# Patient Record
Sex: MALE | Race: WHITE | Employment: FULL TIME | URBAN - METROPOLITAN AREA
[De-identification: names, ages, dates, MRNs, and addresses within clinical notes are randomized per-mention and may not be internally consistent; named-entity substitution may affect disease eponyms.]

---

## 2017-02-24 ENCOUNTER — OFFICE VISIT (OUTPATIENT)
Dept: FAMILY MEDICINE CLINIC | Age: 37
End: 2017-02-24

## 2017-02-24 VITALS
DIASTOLIC BLOOD PRESSURE: 82 MMHG | RESPIRATION RATE: 18 BRPM | TEMPERATURE: 98.3 F | HEIGHT: 69 IN | HEART RATE: 76 BPM | BODY MASS INDEX: 26.07 KG/M2 | WEIGHT: 176 LBS | SYSTOLIC BLOOD PRESSURE: 118 MMHG

## 2017-02-24 DIAGNOSIS — J06.9 VIRAL UPPER RESPIRATORY TRACT INFECTION: Primary | ICD-10-CM

## 2017-02-24 DIAGNOSIS — J11.1 FLU: ICD-10-CM

## 2017-02-24 DIAGNOSIS — R05.9 COUGH: ICD-10-CM

## 2017-02-24 LAB
INFLUENZA A ANTIBODY: NORMAL
INFLUENZA B ANTIBODY: NORMAL

## 2017-02-24 PROCEDURE — G8419 CALC BMI OUT NRM PARAM NOF/U: HCPCS | Performed by: NURSE PRACTITIONER

## 2017-02-24 PROCEDURE — 1036F TOBACCO NON-USER: CPT | Performed by: NURSE PRACTITIONER

## 2017-02-24 PROCEDURE — 99213 OFFICE O/P EST LOW 20 MIN: CPT | Performed by: NURSE PRACTITIONER

## 2017-02-24 PROCEDURE — 87804 INFLUENZA ASSAY W/OPTIC: CPT | Performed by: NURSE PRACTITIONER

## 2017-02-24 PROCEDURE — G8427 DOCREV CUR MEDS BY ELIG CLIN: HCPCS | Performed by: NURSE PRACTITIONER

## 2017-02-24 PROCEDURE — G8484 FLU IMMUNIZE NO ADMIN: HCPCS | Performed by: NURSE PRACTITIONER

## 2017-02-24 RX ORDER — HYDROCODONE POLISTIREX AND CHLORPHENIRAMINE POLISTIREX 10; 8 MG/5ML; MG/5ML
5 SUSPENSION, EXTENDED RELEASE ORAL EVERY 12 HOURS PRN
Qty: 115 ML | Refills: 0 | Status: SHIPPED | OUTPATIENT
Start: 2017-02-24 | End: 2017-05-04 | Stop reason: ALTCHOICE

## 2017-02-24 ASSESSMENT — ENCOUNTER SYMPTOMS
CHEST TIGHTNESS: 0
WHEEZING: 0
SORE THROAT: 1
SHORTNESS OF BREATH: 0
COUGH: 1

## 2017-04-18 ENCOUNTER — OFFICE VISIT (OUTPATIENT)
Dept: FAMILY MEDICINE CLINIC | Age: 37
End: 2017-04-18

## 2017-04-18 VITALS
HEART RATE: 76 BPM | TEMPERATURE: 97.8 F | SYSTOLIC BLOOD PRESSURE: 118 MMHG | HEIGHT: 69 IN | OXYGEN SATURATION: 96 % | RESPIRATION RATE: 18 BRPM | BODY MASS INDEX: 26.96 KG/M2 | WEIGHT: 182 LBS | DIASTOLIC BLOOD PRESSURE: 68 MMHG

## 2017-04-18 DIAGNOSIS — J20.9 ACUTE BRONCHITIS, UNSPECIFIED ORGANISM: Primary | ICD-10-CM

## 2017-04-18 PROCEDURE — 1036F TOBACCO NON-USER: CPT | Performed by: NURSE PRACTITIONER

## 2017-04-18 PROCEDURE — G8419 CALC BMI OUT NRM PARAM NOF/U: HCPCS | Performed by: NURSE PRACTITIONER

## 2017-04-18 PROCEDURE — 99213 OFFICE O/P EST LOW 20 MIN: CPT | Performed by: NURSE PRACTITIONER

## 2017-04-18 PROCEDURE — G8427 DOCREV CUR MEDS BY ELIG CLIN: HCPCS | Performed by: NURSE PRACTITIONER

## 2017-04-18 RX ORDER — DOXYCYCLINE HYCLATE 100 MG
100 TABLET ORAL 2 TIMES DAILY
Qty: 14 TABLET | Refills: 0 | Status: SHIPPED | OUTPATIENT
Start: 2017-04-18 | End: 2017-04-25

## 2017-04-18 RX ORDER — BENZONATATE 100 MG/1
100 CAPSULE ORAL 3 TIMES DAILY PRN
Qty: 30 CAPSULE | Refills: 0 | Status: SHIPPED | OUTPATIENT
Start: 2017-04-18 | End: 2017-07-28 | Stop reason: ALTCHOICE

## 2017-04-18 RX ORDER — METHYLPREDNISOLONE 4 MG/1
TABLET ORAL
Qty: 1 KIT | Refills: 0 | Status: SHIPPED | OUTPATIENT
Start: 2017-04-18 | End: 2017-04-24

## 2017-04-18 ASSESSMENT — PATIENT HEALTH QUESTIONNAIRE - PHQ9
SUM OF ALL RESPONSES TO PHQ QUESTIONS 1-9: 0
1. LITTLE INTEREST OR PLEASURE IN DOING THINGS: 0
2. FEELING DOWN, DEPRESSED OR HOPELESS: 0
SUM OF ALL RESPONSES TO PHQ9 QUESTIONS 1 & 2: 0

## 2017-04-18 ASSESSMENT — ENCOUNTER SYMPTOMS
SORE THROAT: 1
RHINORRHEA: 0
WHEEZING: 1
COUGH: 1

## 2017-05-04 ENCOUNTER — OFFICE VISIT (OUTPATIENT)
Dept: FAMILY MEDICINE CLINIC | Age: 37
End: 2017-05-04

## 2017-05-04 VITALS
HEIGHT: 69 IN | HEART RATE: 70 BPM | DIASTOLIC BLOOD PRESSURE: 72 MMHG | TEMPERATURE: 98.1 F | SYSTOLIC BLOOD PRESSURE: 120 MMHG | WEIGHT: 174 LBS | BODY MASS INDEX: 25.77 KG/M2 | RESPIRATION RATE: 12 BRPM

## 2017-05-04 DIAGNOSIS — J32.9 SINOBRONCHITIS: Primary | ICD-10-CM

## 2017-05-04 DIAGNOSIS — J45.21 RAD (REACTIVE AIRWAY DISEASE), MILD INTERMITTENT, WITH ACUTE EXACERBATION: ICD-10-CM

## 2017-05-04 DIAGNOSIS — J40 SINOBRONCHITIS: Primary | ICD-10-CM

## 2017-05-04 PROCEDURE — G8419 CALC BMI OUT NRM PARAM NOF/U: HCPCS | Performed by: FAMILY MEDICINE

## 2017-05-04 PROCEDURE — 1036F TOBACCO NON-USER: CPT | Performed by: FAMILY MEDICINE

## 2017-05-04 PROCEDURE — G8427 DOCREV CUR MEDS BY ELIG CLIN: HCPCS | Performed by: FAMILY MEDICINE

## 2017-05-04 PROCEDURE — 99213 OFFICE O/P EST LOW 20 MIN: CPT | Performed by: FAMILY MEDICINE

## 2017-05-04 RX ORDER — PREDNISONE 20 MG/1
TABLET ORAL
Qty: 18 TABLET | Refills: 0 | Status: SHIPPED | OUTPATIENT
Start: 2017-05-04 | End: 2017-07-28 | Stop reason: ALTCHOICE

## 2017-05-04 RX ORDER — AZITHROMYCIN 250 MG/1
TABLET, FILM COATED ORAL
Qty: 1 PACKET | Refills: 0 | Status: SHIPPED | OUTPATIENT
Start: 2017-05-04 | End: 2017-05-14

## 2017-05-14 ASSESSMENT — ENCOUNTER SYMPTOMS
WHEEZING: 0
SHORTNESS OF BREATH: 0
ABDOMINAL PAIN: 0
CHEST TIGHTNESS: 1
BLOOD IN STOOL: 0
SINUS PRESSURE: 0
COUGH: 1
RHINORRHEA: 0

## 2017-07-28 ENCOUNTER — OFFICE VISIT (OUTPATIENT)
Dept: FAMILY MEDICINE CLINIC | Age: 37
End: 2017-07-28

## 2017-07-28 VITALS
RESPIRATION RATE: 14 BRPM | DIASTOLIC BLOOD PRESSURE: 76 MMHG | SYSTOLIC BLOOD PRESSURE: 108 MMHG | WEIGHT: 171 LBS | HEART RATE: 66 BPM | TEMPERATURE: 97.3 F | BODY MASS INDEX: 25.33 KG/M2 | HEIGHT: 69 IN

## 2017-07-28 DIAGNOSIS — E55.9 HYPOVITAMINOSIS D: ICD-10-CM

## 2017-07-28 DIAGNOSIS — Z00.00 ENCOUNTER FOR ANNUAL PHYSICAL EXAM: Primary | ICD-10-CM

## 2017-07-28 DIAGNOSIS — Z00.00 ENCOUNTER FOR ANNUAL PHYSICAL EXAM: ICD-10-CM

## 2017-07-28 DIAGNOSIS — E78.5 DYSLIPIDEMIA: ICD-10-CM

## 2017-07-28 DIAGNOSIS — E78.49 OTHER HYPERLIPIDEMIA: ICD-10-CM

## 2017-07-28 DIAGNOSIS — Z83.49 FAMILY HISTORY OF THYROID DISEASE: ICD-10-CM

## 2017-07-28 LAB
ANION GAP SERPL CALCULATED.3IONS-SCNC: 12 MEQ/L (ref 7–13)
BASOPHILS ABSOLUTE: 0 K/UL (ref 0–0.2)
BASOPHILS RELATIVE PERCENT: 0.7 %
BUN BLDV-MCNC: 15 MG/DL (ref 6–20)
CALCIUM SERPL-MCNC: 9.4 MG/DL (ref 8.6–10.2)
CHLORIDE BLD-SCNC: 103 MEQ/L (ref 98–107)
CHOLESTEROL, TOTAL: 234 MG/DL (ref 0–199)
CO2: 26 MEQ/L (ref 22–29)
CREAT SERPL-MCNC: 0.88 MG/DL (ref 0.7–1.2)
EOSINOPHILS ABSOLUTE: 0.2 K/UL (ref 0–0.7)
EOSINOPHILS RELATIVE PERCENT: 2.9 %
GFR AFRICAN AMERICAN: >60
GFR NON-AFRICAN AMERICAN: >60
GLUCOSE BLD-MCNC: 91 MG/DL (ref 74–109)
HCT VFR BLD CALC: 48.7 % (ref 42–52)
HDLC SERPL-MCNC: 76 MG/DL (ref 40–59)
HEMOGLOBIN: 16.3 G/DL (ref 14–18)
LDL CHOLESTEROL CALCULATED: 137 MG/DL (ref 0–129)
LYMPHOCYTES ABSOLUTE: 3 K/UL (ref 1–4.8)
LYMPHOCYTES RELATIVE PERCENT: 40.3 %
MCH RBC QN AUTO: 29.7 PG (ref 27–31.3)
MCHC RBC AUTO-ENTMCNC: 33.5 % (ref 33–37)
MCV RBC AUTO: 88.7 FL (ref 80–100)
MONOCYTES ABSOLUTE: 0.6 K/UL (ref 0.2–0.8)
MONOCYTES RELATIVE PERCENT: 8.7 %
NEUTROPHILS ABSOLUTE: 3.5 K/UL (ref 1.4–6.5)
NEUTROPHILS RELATIVE PERCENT: 47.4 %
PDW BLD-RTO: 13.5 % (ref 11.5–14.5)
PLATELET # BLD: 254 K/UL (ref 130–400)
POTASSIUM SERPL-SCNC: 4.8 MEQ/L (ref 3.5–5.1)
RBC # BLD: 5.49 M/UL (ref 4.7–6.1)
SODIUM BLD-SCNC: 141 MEQ/L (ref 132–144)
TRIGL SERPL-MCNC: 106 MG/DL (ref 0–200)
TSH SERPL DL<=0.05 MIU/L-ACNC: 2.7 UIU/ML (ref 0.27–4.2)
VITAMIN D 25-HYDROXY: 53.3 NG/ML (ref 30–100)
WBC # BLD: 7.3 K/UL (ref 4.8–10.8)

## 2017-07-28 PROCEDURE — 99395 PREV VISIT EST AGE 18-39: CPT | Performed by: FAMILY MEDICINE

## 2017-07-28 RX ORDER — IBUPROFEN 800 MG/1
TABLET ORAL
Refills: 0 | COMMUNITY
Start: 2017-07-14 | End: 2017-09-06 | Stop reason: ALTCHOICE

## 2017-07-28 RX ORDER — HYDROCODONE BITARTRATE AND ACETAMINOPHEN 5; 325 MG/1; MG/1
TABLET ORAL
Refills: 0 | COMMUNITY
Start: 2017-07-14 | End: 2017-09-06 | Stop reason: ALTCHOICE

## 2017-08-06 PROBLEM — E78.5 DYSLIPIDEMIA: Status: ACTIVE | Noted: 2017-08-06

## 2017-08-06 ASSESSMENT — ENCOUNTER SYMPTOMS
RHINORRHEA: 1
BLOOD IN STOOL: 0
SINUS PRESSURE: 0
ABDOMINAL PAIN: 0
WHEEZING: 0

## 2017-09-01 ENCOUNTER — TELEPHONE (OUTPATIENT)
Dept: FAMILY MEDICINE CLINIC | Age: 37
End: 2017-09-01

## 2017-09-01 DIAGNOSIS — L30.9 ECZEMA, UNSPECIFIED TYPE: Primary | ICD-10-CM

## 2017-09-01 RX ORDER — DESOXIMETASONE 2.5 MG/G
CREAM TOPICAL
Qty: 60 G | Refills: 1 | Status: SHIPPED | OUTPATIENT
Start: 2017-09-01 | End: 2019-01-21 | Stop reason: ALTCHOICE

## 2017-09-06 ENCOUNTER — OFFICE VISIT (OUTPATIENT)
Dept: FAMILY MEDICINE CLINIC | Age: 37
End: 2017-09-06

## 2017-09-06 VITALS
BODY MASS INDEX: 26.81 KG/M2 | HEIGHT: 69 IN | WEIGHT: 181 LBS | TEMPERATURE: 97.8 F | DIASTOLIC BLOOD PRESSURE: 82 MMHG | SYSTOLIC BLOOD PRESSURE: 102 MMHG | RESPIRATION RATE: 14 BRPM | HEART RATE: 63 BPM

## 2017-09-06 DIAGNOSIS — L23.2 ALLERGIC CONTACT DERMATITIS DUE TO COSMETICS: Primary | ICD-10-CM

## 2017-09-06 DIAGNOSIS — E78.5 DYSLIPIDEMIA: ICD-10-CM

## 2017-09-06 PROCEDURE — 99212 OFFICE O/P EST SF 10 MIN: CPT | Performed by: FAMILY MEDICINE

## 2017-09-06 PROCEDURE — 1036F TOBACCO NON-USER: CPT | Performed by: FAMILY MEDICINE

## 2017-09-06 PROCEDURE — G8427 DOCREV CUR MEDS BY ELIG CLIN: HCPCS | Performed by: FAMILY MEDICINE

## 2017-09-06 PROCEDURE — G8417 CALC BMI ABV UP PARAM F/U: HCPCS | Performed by: FAMILY MEDICINE

## 2017-09-14 ASSESSMENT — ENCOUNTER SYMPTOMS
SHORTNESS OF BREATH: 0
ABDOMINAL PAIN: 0
VOICE CHANGE: 0
SINUS PRESSURE: 0
TROUBLE SWALLOWING: 0
BACK PAIN: 0

## 2017-12-27 ENCOUNTER — OFFICE VISIT (OUTPATIENT)
Dept: FAMILY MEDICINE CLINIC | Age: 37
End: 2017-12-27

## 2017-12-27 VITALS
BODY MASS INDEX: 28.58 KG/M2 | DIASTOLIC BLOOD PRESSURE: 62 MMHG | HEIGHT: 69 IN | RESPIRATION RATE: 14 BRPM | SYSTOLIC BLOOD PRESSURE: 100 MMHG | WEIGHT: 193 LBS | HEART RATE: 62 BPM | TEMPERATURE: 97.5 F

## 2017-12-27 DIAGNOSIS — R19.8 BORBORYGMUS: ICD-10-CM

## 2017-12-27 DIAGNOSIS — R10.84 ABDOMINAL PAIN, GENERALIZED: ICD-10-CM

## 2017-12-27 DIAGNOSIS — K59.1 FUNCTIONAL DIARRHEA: Primary | ICD-10-CM

## 2017-12-27 DIAGNOSIS — K59.1 FUNCTIONAL DIARRHEA: ICD-10-CM

## 2017-12-27 DIAGNOSIS — E78.5 DYSLIPIDEMIA: ICD-10-CM

## 2017-12-27 PROCEDURE — G8427 DOCREV CUR MEDS BY ELIG CLIN: HCPCS | Performed by: FAMILY MEDICINE

## 2017-12-27 PROCEDURE — 99213 OFFICE O/P EST LOW 20 MIN: CPT | Performed by: FAMILY MEDICINE

## 2017-12-27 PROCEDURE — G8417 CALC BMI ABV UP PARAM F/U: HCPCS | Performed by: FAMILY MEDICINE

## 2017-12-27 PROCEDURE — G8484 FLU IMMUNIZE NO ADMIN: HCPCS | Performed by: FAMILY MEDICINE

## 2017-12-27 PROCEDURE — 1036F TOBACCO NON-USER: CPT | Performed by: FAMILY MEDICINE

## 2017-12-27 RX ORDER — DIPHENOXYLATE HYDROCHLORIDE AND ATROPINE SULFATE 2.5; .025 MG/1; MG/1
1 TABLET ORAL 4 TIMES DAILY PRN
Qty: 30 TABLET | Refills: 1 | Status: SHIPPED | OUTPATIENT
Start: 2017-12-27 | End: 2018-01-06

## 2017-12-27 NOTE — PROGRESS NOTES
Age of Onset    High Blood Pressure Mother     Diabetes Maternal Grandmother     Stroke Maternal Grandmother     Heart Disease Paternal [place]     Stroke Paternal Grandfather           Current Outpatient Prescriptions on File Prior to Visit   Medication Sig Dispense Refill    desoximetasone (TOPICORT) 0.25 % cream Apply topically 2 times daily. 60 g 1    PREVIDENT 5000 SENSITIVE 1.1-5 % PSTE APPLY TOOTHPASTE DAILY  5     No current facility-administered medications on file prior to visit. Objective    Vitals:    12/27/17 1115   BP: 100/62   Pulse: 62   Resp: 14   Temp: 97.5 °F (36.4 °C)   TempSrc: Temporal   Weight: 193 lb (87.5 kg)   Height: 5' 8.5\" (1.74 m)     Physical Exam   Constitutional: He is oriented to person, place, and time and well-developed, well-nourished, and in no distress. No distress. HENT:   Right Ear: Tympanic membrane normal.   Left Ear: Tympanic membrane normal.   Nose: Mucosal edema and rhinorrhea present. Right sinus exhibits no maxillary sinus tenderness. Left sinus exhibits no maxillary sinus tenderness. Mouth/Throat: Oropharynx is clear and moist.   Eyes: No scleral icterus. Neck: Normal range of motion. Neck supple. Carotid bruit is not present. No neck rigidity. No thyroid mass and no thyromegaly present. Cardiovascular: Normal rate, regular rhythm, S1 normal, S2 normal and normal heart sounds. No extrasystoles are present. No murmur heard. Pulmonary/Chest: Effort normal and breath sounds normal.   Abdominal: Soft. Normal appearance and bowel sounds are normal. He exhibits no shifting dullness and no mass. There is no hepatosplenomegaly. There is tenderness (tr/4 tx only) in the left lower quadrant. There is no rigidity, no rebound, no guarding, no CVA tenderness, no tenderness at McBurney's point and negative Hahn's sign. No hernia. Musculoskeletal: He exhibits no edema. Neurological: He is alert and oriented to person, place, and time.  He has for gi. Call or return to clinic prn if these symptoms worsen or fail to improve as anticipated.       Zollie Lev, DO

## 2017-12-29 LAB
ALLERGEN CLAMS IGE: <0.1 KU/L
ALLERGEN CODFISH IGE: <0.1 KU/L
ALLERGEN CORN IGE: <0.1 KU/L
ALLERGEN COW MILK IGE: <0.1 KU/L
ALLERGEN EGG WHITE IGE: <0.1 KU/L
ALLERGEN PEANUT (F13) IGE: <0.1 KU/L
ALLERGEN SCALLOP IGE: <0.1 KU/L
ALLERGEN SEE NOTE: NORMAL
ALLERGEN SHRIMP IGE: <0.1 KU/L
ALLERGEN SOYBEAN IGE: <0.1 KU/L
ALLERGEN WALNUT IGE: <0.1 KU/L
ALLERGEN WHEAT IGE: <0.1 KU/L
IGE: 26 KU/L

## 2018-01-03 ASSESSMENT — ENCOUNTER SYMPTOMS
BLOOD IN STOOL: 0
NAUSEA: 1
BACK PAIN: 0
ABDOMINAL PAIN: 1
ABDOMINAL DISTENTION: 0
VOMITING: 0
DIARRHEA: 1
SHORTNESS OF BREATH: 0

## 2019-01-21 ENCOUNTER — OFFICE VISIT (OUTPATIENT)
Dept: FAMILY MEDICINE CLINIC | Age: 39
End: 2019-01-21
Payer: COMMERCIAL

## 2019-01-21 VITALS
WEIGHT: 177 LBS | BODY MASS INDEX: 26.22 KG/M2 | HEART RATE: 72 BPM | HEIGHT: 69 IN | TEMPERATURE: 97.8 F | RESPIRATION RATE: 14 BRPM | SYSTOLIC BLOOD PRESSURE: 112 MMHG | DIASTOLIC BLOOD PRESSURE: 70 MMHG

## 2019-01-21 DIAGNOSIS — R35.0 URINARY FREQUENCY: ICD-10-CM

## 2019-01-21 DIAGNOSIS — Z00.00 ANNUAL PHYSICAL EXAM: ICD-10-CM

## 2019-01-21 DIAGNOSIS — Z00.00 ANNUAL PHYSICAL EXAM: Primary | ICD-10-CM

## 2019-01-21 DIAGNOSIS — E78.5 DYSLIPIDEMIA: ICD-10-CM

## 2019-01-21 DIAGNOSIS — E55.9 HYPOVITAMINOSIS D: ICD-10-CM

## 2019-01-21 LAB
ALBUMIN SERPL-MCNC: 4.2 G/DL (ref 3.9–4.9)
ALP BLD-CCNC: 53 U/L (ref 35–104)
ALT SERPL-CCNC: 23 U/L (ref 0–41)
ANION GAP SERPL CALCULATED.3IONS-SCNC: 11 MEQ/L (ref 7–13)
AST SERPL-CCNC: 28 U/L (ref 0–40)
BILIRUB SERPL-MCNC: 0.6 MG/DL (ref 0–1.2)
BILIRUBIN, POC: NORMAL
BLOOD URINE, POC: NORMAL
BUN BLDV-MCNC: 13 MG/DL (ref 6–20)
CALCIUM SERPL-MCNC: 9.1 MG/DL (ref 8.6–10.2)
CHLORIDE BLD-SCNC: 100 MEQ/L (ref 98–107)
CHOLESTEROL, TOTAL: 196 MG/DL (ref 0–199)
CLARITY, POC: CLEAR
CO2: 27 MEQ/L (ref 22–29)
COLOR, POC: YELLOW
CREAT SERPL-MCNC: 0.93 MG/DL (ref 0.7–1.2)
GFR AFRICAN AMERICAN: >60
GFR NON-AFRICAN AMERICAN: >60
GLOBULIN: 2.4 G/DL (ref 2.3–3.5)
GLUCOSE BLD-MCNC: 86 MG/DL (ref 74–109)
GLUCOSE URINE, POC: NORMAL
HDLC SERPL-MCNC: 56 MG/DL (ref 40–59)
KETONES, POC: NORMAL
LDL CHOLESTEROL CALCULATED: 122 MG/DL (ref 0–129)
LEUKOCYTE EST, POC: NORMAL
NITRITE, POC: NORMAL
PH, POC: 6
POTASSIUM SERPL-SCNC: 4.3 MEQ/L (ref 3.5–5.1)
PROTEIN, POC: NORMAL
SODIUM BLD-SCNC: 138 MEQ/L (ref 132–144)
SPECIFIC GRAVITY, POC: 1.01
TOTAL PROTEIN: 6.6 G/DL (ref 6.4–8.1)
TRIGL SERPL-MCNC: 89 MG/DL (ref 0–200)
UROBILINOGEN, POC: NORMAL

## 2019-01-21 PROCEDURE — G8484 FLU IMMUNIZE NO ADMIN: HCPCS | Performed by: FAMILY MEDICINE

## 2019-01-21 PROCEDURE — 81003 URINALYSIS AUTO W/O SCOPE: CPT | Performed by: FAMILY MEDICINE

## 2019-01-21 PROCEDURE — 99395 PREV VISIT EST AGE 18-39: CPT | Performed by: FAMILY MEDICINE

## 2019-01-21 ASSESSMENT — PATIENT HEALTH QUESTIONNAIRE - PHQ9
SUM OF ALL RESPONSES TO PHQ9 QUESTIONS 1 & 2: 0
SUM OF ALL RESPONSES TO PHQ QUESTIONS 1-9: 0
SUM OF ALL RESPONSES TO PHQ QUESTIONS 1-9: 0
2. FEELING DOWN, DEPRESSED OR HOPELESS: 0
1. LITTLE INTEREST OR PLEASURE IN DOING THINGS: 0

## 2019-01-28 ASSESSMENT — ENCOUNTER SYMPTOMS
DIARRHEA: 0
VOMITING: 0
CONSTIPATION: 0
SHORTNESS OF BREATH: 0
ABDOMINAL PAIN: 0
SORE THROAT: 0
BLOOD IN STOOL: 0
COUGH: 0

## 2019-02-13 ENCOUNTER — TELEPHONE (OUTPATIENT)
Dept: FAMILY MEDICINE CLINIC | Age: 39
End: 2019-02-13

## 2019-03-05 ENCOUNTER — OFFICE VISIT (OUTPATIENT)
Dept: FAMILY MEDICINE CLINIC | Age: 39
End: 2019-03-05
Payer: COMMERCIAL

## 2019-03-05 VITALS
HEIGHT: 69 IN | DIASTOLIC BLOOD PRESSURE: 68 MMHG | WEIGHT: 192 LBS | RESPIRATION RATE: 14 BRPM | SYSTOLIC BLOOD PRESSURE: 102 MMHG | BODY MASS INDEX: 28.44 KG/M2 | TEMPERATURE: 98 F | HEART RATE: 60 BPM

## 2019-03-05 DIAGNOSIS — J40 SINOBRONCHITIS: Primary | ICD-10-CM

## 2019-03-05 DIAGNOSIS — L30.8 OTHER ECZEMA: ICD-10-CM

## 2019-03-05 DIAGNOSIS — J32.9 SINOBRONCHITIS: Primary | ICD-10-CM

## 2019-03-05 DIAGNOSIS — B35.6 TINEA CRURIS: ICD-10-CM

## 2019-03-05 PROCEDURE — 99213 OFFICE O/P EST LOW 20 MIN: CPT | Performed by: FAMILY MEDICINE

## 2019-03-05 PROCEDURE — G8419 CALC BMI OUT NRM PARAM NOF/U: HCPCS | Performed by: FAMILY MEDICINE

## 2019-03-05 PROCEDURE — G8484 FLU IMMUNIZE NO ADMIN: HCPCS | Performed by: FAMILY MEDICINE

## 2019-03-05 PROCEDURE — 1036F TOBACCO NON-USER: CPT | Performed by: FAMILY MEDICINE

## 2019-03-05 PROCEDURE — G8427 DOCREV CUR MEDS BY ELIG CLIN: HCPCS | Performed by: FAMILY MEDICINE

## 2019-03-05 RX ORDER — AZITHROMYCIN 250 MG/1
250 TABLET, FILM COATED ORAL SEE ADMIN INSTRUCTIONS
Qty: 6 TABLET | Refills: 0 | Status: SHIPPED | OUTPATIENT
Start: 2019-03-05 | End: 2019-03-10

## 2019-03-13 ASSESSMENT — ENCOUNTER SYMPTOMS
SINUS PRESSURE: 1
COUGH: 1
ABDOMINAL PAIN: 0
CHEST TIGHTNESS: 0
SORE THROAT: 1
SHORTNESS OF BREATH: 0
VOICE CHANGE: 0
RHINORRHEA: 1
SINUS PAIN: 0

## 2023-06-26 LAB
APPEARANCE, URINE: CLEAR
BILIRUBIN, URINE: NEGATIVE
BLOOD, URINE: NEGATIVE
COLOR, URINE: YELLOW
GLUCOSE, URINE: NEGATIVE MG/DL
KETONES, URINE: NEGATIVE MG/DL
LEUKOCYTE ESTERASE, URINE: NEGATIVE
NITRITE, URINE: NEGATIVE
PH, URINE: 7 (ref 5–8)
PROTEIN, URINE: NEGATIVE MG/DL
SPECIFIC GRAVITY, URINE: 1.01 (ref 1–1.03)
UROBILINOGEN, URINE: <2 MG/DL (ref 0–1.9)

## 2023-06-29 LAB
PROSTATE SPECIFIC AG (NG/ML) IN SER/PLAS: 0.5 NG/ML (ref 0–4)
PROSTATE SPECIFIC AG FREE (NG/ML) IN SER/PLAS: 0.2 NG/ML
PROSTATE SPECIFIC AG FREE/PROSTATE SPECIFIC AG TOTAL IN SER/PLAS: 40 %

## 2023-12-15 ENCOUNTER — APPOINTMENT (OUTPATIENT)
Dept: PRIMARY CARE | Facility: CLINIC | Age: 43
End: 2023-12-15
Payer: COMMERCIAL

## 2023-12-15 PROBLEM — J31.0 CHRONIC RHINITIS: Status: ACTIVE | Noted: 2023-12-15

## 2023-12-15 PROBLEM — K57.92 ACUTE DIVERTICULITIS: Status: ACTIVE | Noted: 2023-12-15

## 2023-12-15 PROBLEM — R07.89 ATYPICAL CHEST PAIN: Status: ACTIVE | Noted: 2023-12-15

## 2023-12-15 PROBLEM — E78.5 DYSLIPIDEMIA: Status: ACTIVE | Noted: 2023-12-15

## 2023-12-15 PROBLEM — B35.6 TINEA CRURIS: Status: ACTIVE | Noted: 2023-12-15

## 2023-12-15 PROBLEM — K13.29 WHITE PATCHES ON ORAL MUCOSA: Status: ACTIVE | Noted: 2023-12-15

## 2023-12-15 PROBLEM — R53.83 FATIGUE: Status: ACTIVE | Noted: 2023-12-15

## 2023-12-15 PROBLEM — R35.0 URINARY FREQUENCY: Status: ACTIVE | Noted: 2023-12-15

## 2023-12-15 PROBLEM — B37.0 ORAL THRUSH: Status: ACTIVE | Noted: 2023-12-15

## 2023-12-15 PROBLEM — R35.1 NOCTURIA: Status: ACTIVE | Noted: 2023-12-15

## 2023-12-15 PROBLEM — F41.9 ANXIETY: Status: ACTIVE | Noted: 2023-12-15

## 2023-12-15 PROBLEM — E78.00 ELEVATED SERUM CHOLESTEROL: Status: ACTIVE | Noted: 2023-12-15

## 2023-12-15 PROBLEM — B35.3 TINEA PEDIS OF BOTH FEET: Status: ACTIVE | Noted: 2023-12-15

## 2023-12-15 PROBLEM — E55.9 VITAMIN D DEFICIENCY: Status: ACTIVE | Noted: 2023-12-15

## 2023-12-15 PROBLEM — K14.6 TONGUE SORE: Status: ACTIVE | Noted: 2023-12-15

## 2023-12-18 ENCOUNTER — OFFICE VISIT (OUTPATIENT)
Dept: PRIMARY CARE | Facility: CLINIC | Age: 43
End: 2023-12-18
Payer: COMMERCIAL

## 2023-12-18 VITALS
WEIGHT: 186 LBS | DIASTOLIC BLOOD PRESSURE: 76 MMHG | HEART RATE: 68 BPM | BODY MASS INDEX: 27.55 KG/M2 | SYSTOLIC BLOOD PRESSURE: 116 MMHG | HEIGHT: 69 IN | TEMPERATURE: 98.4 F

## 2023-12-18 DIAGNOSIS — M77.02 MEDIAL EPICONDYLITIS OF ELBOW, LEFT: Primary | ICD-10-CM

## 2023-12-18 PROCEDURE — 1036F TOBACCO NON-USER: CPT | Performed by: STUDENT IN AN ORGANIZED HEALTH CARE EDUCATION/TRAINING PROGRAM

## 2023-12-18 PROCEDURE — 99214 OFFICE O/P EST MOD 30 MIN: CPT | Performed by: STUDENT IN AN ORGANIZED HEALTH CARE EDUCATION/TRAINING PROGRAM

## 2023-12-18 RX ORDER — NAPROXEN 500 MG/1
500 TABLET ORAL
Qty: 28 TABLET | Refills: 1 | Status: SHIPPED | OUTPATIENT
Start: 2023-12-18 | End: 2024-01-15

## 2023-12-18 ASSESSMENT — PATIENT HEALTH QUESTIONNAIRE - PHQ9
2. FEELING DOWN, DEPRESSED OR HOPELESS: NOT AT ALL
SUM OF ALL RESPONSES TO PHQ9 QUESTIONS 1 AND 2: 0
1. LITTLE INTEREST OR PLEASURE IN DOING THINGS: NOT AT ALL

## 2023-12-18 NOTE — PROGRESS NOTES
"Subjective   Patient ID: Aguila Jerry is a 43 y.o. male who presents for left elbow pain (Patient advised he has noticed some left elbow pain over the past week, he also has some numbness/tingling in his left hand).    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   Constitutional:       General: He is not in acute distress.     Appearance: Normal appearance. He is not ill-appearing or toxic-appearing.   HENT:      Head: Atraumatic.      Mouth/Throat:      Mouth: Mucous membranes are moist.      Pharynx: Oropharynx is clear.   Eyes:      Extraocular Movements: Extraocular movements intact.      Conjunctiva/sclera: Conjunctivae normal.      Pupils: Pupils are equal, round, and reactive to light.   Cardiovascular:      Rate and Rhythm: Normal rate and regular rhythm.      Pulses: Normal pulses.      Heart sounds: Normal heart sounds. No murmur heard.  Pulmonary:      Effort: Pulmonary effort is normal. No respiratory distress.      Breath sounds: Normal breath sounds.   Abdominal:      General: Abdomen is flat.      Palpations: Abdomen is soft.      Tenderness: There is no abdominal tenderness.   Musculoskeletal:      Left elbow: Tenderness present in medial epicondyle.      Right lower leg: No edema.      Left lower leg: No edema.   Skin:     General: Skin is warm and dry.      Capillary Refill: Capillary refill takes less than 2 seconds.      Findings: No rash.   Neurological:      General: No focal deficit present.      Mental Status: He is alert.      Cranial Nerves: No cranial nerve deficit.      " Gait: Gait normal.   Psychiatric:         Mood and Affect: Mood normal.         Behavior: Behavior normal.         Assessment/Plan   Problem List Items Addressed This Visit    None  Visit Diagnoses         Codes    Medial epicondylitis of elbow, left    -  Primary M77.02    Relevant Medications    naproxen (Naprosyn) 500 mg tablet

## 2023-12-22 NOTE — PROGRESS NOTES
Assessment/Plan   Patient's neck and left periscapular pain is consistent with possible mild cervical radiculopathy considering he has slight diminished triceps reflex however his strength is preserved and there are no other neurologic deficits.  Treatment will involve soft tissue and spinal manipulation which she has responded well to in the past.  We can hold off of any diagnostic imaging pending a trial of care.  Left distal arm appears to be strain of muscle and we will monitor response to treatment.    Visits this year: 1    Subjective     HPI - Neck pain 12/26/2023 -patient endorses 1 month insidious onset of neck pain left cervical thoracic pain and periscapular pain with occasional tingling in the left upper extremity and hand.  Symptoms came on without specific injury although he endorses a potentially unrelated issue of the left distal biceps/medial elbow.  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  Notes that his symptoms are exacerbated when extending his neck looking up.  Previously has benefited from chiropractic spinal manipulation but has not had this treatment recently. Prior history of low back pain and herniated disc and sciatic pain but this is currently not bothering him    Review of Systems   Constitutional:  Negative for fever.   Eyes:  Negative for visual disturbance.   Respiratory:  Negative for shortness of breath.    Cardiovascular:  Negative for chest pain.   Gastrointestinal:  Negative for diarrhea, nausea and vomiting.   Genitourinary:  Negative for difficulty urinating and dysuria.   Skin:  Negative for color change.   Neurological:  Negative for dizziness.   Psychiatric/Behavioral:  Negative for agitation.    All other systems reviewed and are negative.    Objective   Examination findings (e.g., palpation & ROM): Decreased C/S and L/S ROM with pain, hypertonic and tender cervical  erectors, L upper trapezius, infraspinatus, and brachialis/biceps  +ve ULTT L  Mild reduction L shoulder IR    Segmental joint dysfunction was identified in the following areas using motion palpation and/or pain provocation assessment:  Cervical: 7  Thoracic: 4-8  Lumbopelvic: 1      Physical Exam  Neurological:      Mental Status: He is alert.      Sensory: Sensation is intact.      Motor: Motor function is intact.      Coordination: Coordination is intact.      Gait: Gait is intact.      Deep Tendon Reflexes:      Reflex Scores:       Tricep reflexes are 1+ on the left side.       Bicep reflexes are 2+ on the right side and 2+ on the left side.       Brachioradialis reflexes are 2+ on the right side and 2+ on the left side.       Patellar reflexes are 2+ on the right side and 2+ on the left side.       Achilles reflexes are 2+ on the right side and 2+ on the left side.     Comments: SOLOMONMARY str wnl 12/26/2023       Plan   Today's treatment:  SMT to regions of segmental dysfunction identified on exam, using age-appropriate force, and manual diversified technique.   STM to patient tolerance to hypertonic paraspinal muscles  Manual dynamic stretching of the upper trapezius  11:20 to 11:35 AM    Treatment Plan:   The patient and I discussed the risks and benefits of chiropractic care. Based on the patient's subjective complaints along with the examination findings, it is advised that a course of chiropractic treatment by initiated. The patient provided consent for care. The patient tolerated today's treatment with little or no additional discomfort and was instructed to contact the office for questions or concerns. Will see patient once per week then every 2 weeks when symptoms become mild/manageable, further spaced apart contingent upon improvement.     This chart note was generated using dictation software, and as such, there may be typographical errors present. Abbreviations: Cervical spine (CS), cervical-thoracic (CT),  Dry needling (DN), Flexion adduction internal rotation (FAIR), high velocity, low amplitude (HVLA), Lumbar spine (LS), Soft tissue manipulation (STM), spinal manipulative therapy (SMT), Straight leg raise (SLR), Thoracic spine (TS).

## 2023-12-26 ENCOUNTER — ALLIED HEALTH (OUTPATIENT)
Dept: INTEGRATIVE MEDICINE | Facility: CLINIC | Age: 43
End: 2023-12-26
Payer: COMMERCIAL

## 2023-12-26 DIAGNOSIS — M99.02 SOMATIC DYSFUNCTION OF THORACIC REGION: ICD-10-CM

## 2023-12-26 DIAGNOSIS — M99.01 CERVICAL SEGMENT DYSFUNCTION: ICD-10-CM

## 2023-12-26 DIAGNOSIS — M99.03 SOMATIC DYSFUNCTION OF LUMBAR REGION: Primary | ICD-10-CM

## 2023-12-26 DIAGNOSIS — M79.10 MYALGIA: ICD-10-CM

## 2023-12-26 DIAGNOSIS — M54.12 CERVICAL RADICULOPATHY: ICD-10-CM

## 2023-12-26 PROCEDURE — 99203 OFFICE O/P NEW LOW 30 MIN: CPT | Performed by: CHIROPRACTOR

## 2023-12-26 PROCEDURE — 98941 CHIROPRACT MANJ 3-4 REGIONS: CPT | Performed by: CHIROPRACTOR

## 2023-12-26 PROCEDURE — 97112 NEUROMUSCULAR REEDUCATION: CPT | Performed by: CHIROPRACTOR

## 2023-12-26 ASSESSMENT — ENCOUNTER SYMPTOMS
DYSURIA: 0
VOMITING: 0
DIZZINESS: 0
DIARRHEA: 0
DIFFICULTY URINATING: 0
FEVER: 0
NAUSEA: 0
AGITATION: 0
COLOR CHANGE: 0
SHORTNESS OF BREATH: 0

## 2024-01-03 ASSESSMENT — ENCOUNTER SYMPTOMS
DIARRHEA: 0
VOMITING: 0
SHORTNESS OF BREATH: 0
DIFFICULTY URINATING: 0
NAUSEA: 0
DIZZINESS: 0
FEVER: 0
AGITATION: 0
DYSURIA: 0
COLOR CHANGE: 0

## 2024-01-03 NOTE — PROGRESS NOTES
Assessment/Plan   Patient's neck and left periscapular pain is consistent with possible mild cervical radiculopathy considering he has slight diminished triceps reflex however his strength is preserved and there are no other neurologic deficits.  Treatment will involve soft tissue and spinal manipulation which she has responded well to in the past.  We can hold off of any diagnostic imaging pending a trial of care.  Left distal arm appears to be strain of muscle and we will monitor response to treatment.    Visits this year: 1    Subjective   Patient reports that he felt good after last visit and symptoms went away for about a day and then gradually returned.  Feels moderate discomfort to severe in the neck and paresthesia in the left upper extremity that is intermittent and comes and goes depending on what he is doing with his arm but describes symptoms more as discomfort rather than pain.  Has been stretching his neck regularly    HPI - Neck pain 12/26/2023 -patient endorses 1 month insidious onset of neck pain left cervical thoracic pain and periscapular pain with occasional tingling in the left upper extremity and hand.  Symptoms came on without specific injury although he endorses a potentially unrelated issue of the left distal biceps/medial elbow.  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  Notes that his symptoms are exacerbated when extending his neck looking up.  Previously has benefited from chiropractic spinal manipulation but has not had this treatment recently. Prior history of low back pain and herniated disc and sciatic pain but this is currently not bothering him    Review of Systems   Constitutional:  Negative for fever.   Eyes:  Negative for visual disturbance.   Respiratory:  Negative for shortness of breath.    Cardiovascular:  Negative for chest pain.   Gastrointestinal:  Negative for diarrhea,  nausea and vomiting.   Genitourinary:  Negative for difficulty urinating and dysuria.   Skin:  Negative for color change.   Neurological:  Negative for dizziness.   Psychiatric/Behavioral:  Negative for agitation.    All other systems reviewed and are negative.    Objective   Examination findings (e.g., palpation & ROM): Decreased C/S and L/S ROM with pain, hypertonic and tender cervical erectors, L upper trapezius, infraspinatus, and brachialis/biceps  +ve ULTT L  Mild reduction L shoulder IR    Segmental joint dysfunction was identified in the following areas using motion palpation and/or pain provocation assessment:  Cervical: 7  Thoracic: 4-8  Lumbopelvic: 1      Physical Exam  Neurological:      Mental Status: He is alert.      Sensory: Sensation is intact.      Motor: Motor function is intact.      Coordination: Coordination is intact.      Gait: Gait is intact.      Deep Tendon Reflexes:      Reflex Scores:       Tricep reflexes are 1+ on the left side.       Bicep reflexes are 2+ on the right side and 2+ on the left side.       Brachioradialis reflexes are 2+ on the right side and 2+ on the left side.       Patellar reflexes are 2+ on the right side and 2+ on the left side.       Achilles reflexes are 2+ on the right side and 2+ on the left side.     Comments: UE str wnl 12/26/2023       Plan   Today's treatment:  SMT to regions of segmental dysfunction identified on exam, using age-appropriate force, and manual diversified technique.   STM to patient tolerance to hypertonic paraspinal muscles  Manual dynamic stretching of the upper trapezius & periscapular muscles BL  2:40 to 3:05 PM    Treatment Plan:   The patient and I discussed the risks and benefits of chiropractic care. Based on the patient's subjective complaints along with the examination findings, it is advised that a course of chiropractic treatment by initiated. The patient provided consent for care. The patient tolerated today's treatment with  little or no additional discomfort and was instructed to contact the office for questions or concerns. Will see patient once per week then every 2 weeks when symptoms become mild/manageable, further spaced apart contingent upon improvement.     This chart note was generated using dictation software, and as such, there may be typographical errors present. Abbreviations: Cervical spine (CS), cervical-thoracic (CT), Dry needling (DN), Flexion adduction internal rotation (FAIR), high velocity, low amplitude (HVLA), Lumbar spine (LS), Soft tissue manipulation (STM), spinal manipulative therapy (SMT), Straight leg raise (SLR), Thoracic spine (TS).

## 2024-01-04 ENCOUNTER — ALLIED HEALTH (OUTPATIENT)
Dept: INTEGRATIVE MEDICINE | Facility: CLINIC | Age: 44
End: 2024-01-04
Payer: COMMERCIAL

## 2024-01-04 DIAGNOSIS — M99.03 SOMATIC DYSFUNCTION OF LUMBAR REGION: Primary | ICD-10-CM

## 2024-01-04 DIAGNOSIS — M79.10 MYALGIA: ICD-10-CM

## 2024-01-04 DIAGNOSIS — M54.12 CERVICAL RADICULOPATHY: ICD-10-CM

## 2024-01-04 DIAGNOSIS — M99.01 CERVICAL SEGMENT DYSFUNCTION: ICD-10-CM

## 2024-01-04 DIAGNOSIS — M99.02 SOMATIC DYSFUNCTION OF THORACIC REGION: ICD-10-CM

## 2024-01-04 PROCEDURE — 98941 CHIROPRACT MANJ 3-4 REGIONS: CPT | Performed by: CHIROPRACTOR

## 2024-01-04 PROCEDURE — 97112 NEUROMUSCULAR REEDUCATION: CPT | Performed by: CHIROPRACTOR

## 2024-01-11 ASSESSMENT — ENCOUNTER SYMPTOMS
FEVER: 0
AGITATION: 0
DIFFICULTY URINATING: 0
VOMITING: 0
DIZZINESS: 0
SHORTNESS OF BREATH: 0
DYSURIA: 0
NAUSEA: 0
COLOR CHANGE: 0
DIARRHEA: 0

## 2024-01-11 NOTE — PROGRESS NOTES
Assessment/Plan   Patient's neck and left periscapular pain is consistent with possible mild cervical radiculopathy considering he has slight diminished triceps reflex however his strength is preserved and there are no other neurologic deficits.  Treatment will involve soft tissue and spinal manipulation which she has responded well to in the past.  We can hold off of any diagnostic imaging pending a trial of care.  Left distal arm appears to be strain of muscle and we will monitor response to treatment.    Visits this year: 2    Subjective   Patient reports that he got some relief after last visit for at least a day and then symptoms gradually returned however more recently over the past few days he started using an inflatable cervical traction device.  Feels this has been alleviating some of his symptoms.  Currently endorses mild to moderate intermittent pain in the neck with radiation to the left upper extremity including the anterior and lateral arm area    HPI - Neck pain 12/26/2023 -patient endorses 1 month insidious onset of neck pain left cervical thoracic pain and periscapular pain with occasional tingling in the left upper extremity and hand.  Symptoms came on without specific injury although he endorses a potentially unrelated issue of the left distal biceps/medial elbow.  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  Notes that his symptoms are exacerbated when extending his neck looking up.  Previously has benefited from chiropractic spinal manipulation but has not had this treatment recently. Prior history of low back pain and herniated disc and sciatic pain but this is currently not bothering him    Review of Systems   Constitutional:  Negative for fever.   Eyes:  Negative for visual disturbance.   Respiratory:  Negative for shortness of breath.    Cardiovascular:  Negative for chest pain.    Gastrointestinal:  Negative for diarrhea, nausea and vomiting.   Genitourinary:  Negative for difficulty urinating and dysuria.   Skin:  Negative for color change.   Neurological:  Negative for dizziness.   Psychiatric/Behavioral:  Negative for agitation.    All other systems reviewed and are negative.    Objective   Examination findings (e.g., palpation & ROM): Decreased C/S and L/S ROM with pain, hypertonic and tender cervical erectors, L upper trapezius, infraspinatus  +ve ULTT L  Mild reduction L shoulder IR    Segmental joint dysfunction was identified in the following areas using motion palpation and/or pain provocation assessment:  Cervical: 7  Thoracic: 4-10  Lumbopelvic: 1      Physical Exam  Neurological:      Mental Status: He is alert.      Sensory: Sensation is intact.      Motor: Motor function is intact.      Coordination: Coordination is intact.      Gait: Gait is intact.      Deep Tendon Reflexes:      Reflex Scores:       Tricep reflexes are 1+ on the left side.       Bicep reflexes are 2+ on the right side and 2+ on the left side.       Brachioradialis reflexes are 2+ on the right side and 2+ on the left side.       Patellar reflexes are 2+ on the right side and 2+ on the left side.       Achilles reflexes are 2+ on the right side and 2+ on the left side.     Comments: UE str wnl 12/26/2023       Plan   Today's treatment:  SMT to regions of segmental dysfunction identified on exam, using age-appropriate force, and manual diversified technique.   STM to patient tolerance to hypertonic paraspinal muscles  Manual dynamic stretching of the upper trapezius & periscapular muscles BL  2:40 to 2:55 PM    Treatment Plan:   The patient and I discussed the risks and benefits of chiropractic care. Based on the patient's subjective complaints along with the examination findings, it is advised that a course of chiropractic treatment by initiated. The patient provided consent for care. The patient tolerated  today's treatment with little or no additional discomfort and was instructed to contact the office for questions or concerns. Will see patient once per week then every 2 weeks when symptoms become mild/manageable, further spaced apart contingent upon improvement.     This chart note was generated using dictation software, and as such, there may be typographical errors present. Abbreviations: Cervical spine (CS), cervical-thoracic (CT), Dry needling (DN), Flexion adduction internal rotation (FAIR), high velocity, low amplitude (HVLA), Lumbar spine (LS), Soft tissue manipulation (STM), spinal manipulative therapy (SMT), Straight leg raise (SLR), Thoracic spine (TS).

## 2024-01-16 ENCOUNTER — HOSPITAL ENCOUNTER (OUTPATIENT)
Dept: RADIOLOGY | Facility: HOSPITAL | Age: 44
Discharge: HOME | End: 2024-01-16
Payer: COMMERCIAL

## 2024-01-16 ENCOUNTER — ALLIED HEALTH (OUTPATIENT)
Dept: INTEGRATIVE MEDICINE | Facility: CLINIC | Age: 44
End: 2024-01-16
Payer: COMMERCIAL

## 2024-01-16 DIAGNOSIS — M79.10 MYALGIA: ICD-10-CM

## 2024-01-16 DIAGNOSIS — M99.03 SOMATIC DYSFUNCTION OF LUMBAR REGION: Primary | ICD-10-CM

## 2024-01-16 DIAGNOSIS — M99.01 CERVICAL SEGMENT DYSFUNCTION: ICD-10-CM

## 2024-01-16 DIAGNOSIS — M54.12 CERVICAL RADICULOPATHY: ICD-10-CM

## 2024-01-16 DIAGNOSIS — M99.02 SOMATIC DYSFUNCTION OF THORACIC REGION: ICD-10-CM

## 2024-01-16 PROCEDURE — 72050 X-RAY EXAM NECK SPINE 4/5VWS: CPT | Performed by: RADIOLOGY

## 2024-01-16 PROCEDURE — 72050 X-RAY EXAM NECK SPINE 4/5VWS: CPT

## 2024-01-16 PROCEDURE — 98941 CHIROPRACT MANJ 3-4 REGIONS: CPT | Performed by: CHIROPRACTOR

## 2024-01-16 PROCEDURE — 97112 NEUROMUSCULAR REEDUCATION: CPT | Performed by: CHIROPRACTOR

## 2024-01-19 ASSESSMENT — ENCOUNTER SYMPTOMS
COLOR CHANGE: 0
NAUSEA: 0
DIFFICULTY URINATING: 0
DYSURIA: 0
DIZZINESS: 0
VOMITING: 0
AGITATION: 0
DIARRHEA: 0
SHORTNESS OF BREATH: 0
FEVER: 0

## 2024-01-19 NOTE — PROGRESS NOTES
Assessment/Plan   Patient's neck and left periscapular pain is consistent with possible mild cervical radiculopathy considering he has slight diminished triceps reflex however his strength is preserved and there are no other neurologic deficits.  Treatment will involve soft tissue and spinal manipulation which he has responded well to in the past.  CS radiograph January 2024 showed mild degenerative changes at C5/6 which appears to correlate with symptoms. Recommended ongoing card and we can obtain C/S MRI if needed if symptoms don't continue to improve or he develops any weakness or more significant neurological deficits.    Visits this year: 3    Subjective   Patient reports that he did obtain relief after last visit not immediately but over the next few days he began to feel better gradually.  Currently endorses moderate pain and tightness in the neck with intermittent radiation to the left shoulder and arm.  Has been doing some neck stretches as advised.  Feels that repetitive movements at work continues to aggravate his left medial elbow area, not severe but noticeable and bothersome    HPI - Neck pain 12/26/2023 -patient endorses 1 month insidious onset of neck pain left cervical thoracic pain and periscapular pain with occasional tingling in the left upper extremity and hand.  Symptoms came on without specific injury although he endorses a potentially unrelated issue of the left distal biceps/medial elbow.  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  Notes that his symptoms are exacerbated when extending his neck looking up.  Previously has benefited from chiropractic spinal manipulation but has not had this treatment recently. Prior history of low back pain and herniated disc and sciatic pain but this is currently not bothering him    Review of Systems   Constitutional:  Negative for fever.   Eyes:  Negative  for visual disturbance.   Respiratory:  Negative for shortness of breath.    Cardiovascular:  Negative for chest pain.   Gastrointestinal:  Negative for diarrhea, nausea and vomiting.   Genitourinary:  Negative for difficulty urinating and dysuria.   Skin:  Negative for color change.   Neurological:  Negative for dizziness.   Psychiatric/Behavioral:  Negative for agitation.    All other systems reviewed and are negative.    Objective   Examination findings (e.g., palpation & ROM): Decreased C/S and L/S ROM with pain, hypertonic and tender cervical erectors, L upper trapezius, infraspinatus  +ve ULTT L  Mild reduction L shoulder IR    Segmental joint dysfunction was identified in the following areas using motion palpation and/or pain provocation assessment:  Cervical: 7  Thoracic: 4-10  Lumbopelvic: 1      Physical Exam  Neurological:      Mental Status: He is alert.      Sensory: Sensation is intact.      Motor: Motor function is intact.      Coordination: Coordination is intact.      Gait: Gait is intact.      Deep Tendon Reflexes:      Reflex Scores:       Tricep reflexes are 1+ on the left side.       Bicep reflexes are 2+ on the right side and 2+ on the left side.       Brachioradialis reflexes are 2+ on the right side and 2+ on the left side.       Patellar reflexes are 2+ on the right side and 2+ on the left side.       Achilles reflexes are 2+ on the right side and 2+ on the left side.     Comments: UE str wnl 12/26/2023     Plan   Today's treatment:  SMT to regions of segmental dysfunction identified on exam, using age-appropriate force, and manual diversified technique.   STM to patient tolerance to hypertonic paraspinal muscles  Manual dynamic stretching of the upper trapezius & periscapular muscles BL  2:41 to 2:56 PM  Patient noted reduced pain and improved mobility post-treatment    Treatment Plan:   The patient and I discussed the risks and benefits of chiropractic care. Based on the patient's  subjective complaints along with the examination findings, it is advised that a course of chiropractic treatment by initiated. The patient provided consent for care. The patient tolerated today's treatment with little or no additional discomfort and was instructed to contact the office for questions or concerns. Will see patient once per week then every 2 weeks when symptoms become mild/manageable, further spaced apart contingent upon improvement.     This chart note was generated using dictation software, and as such, there may be typographical errors present. Abbreviations: Cervical spine (CS), cervical-thoracic (CT), Dry needling (DN), Flexion adduction internal rotation (FAIR), high velocity, low amplitude (HVLA), Lumbar spine (LS), Soft tissue manipulation (STM), spinal manipulative therapy (SMT), Straight leg raise (SLR), Thoracic spine (TS).

## 2024-01-23 ENCOUNTER — ALLIED HEALTH (OUTPATIENT)
Dept: INTEGRATIVE MEDICINE | Facility: CLINIC | Age: 44
End: 2024-01-23
Payer: COMMERCIAL

## 2024-01-23 DIAGNOSIS — M79.10 MYALGIA: ICD-10-CM

## 2024-01-23 DIAGNOSIS — M54.12 CERVICAL RADICULOPATHY: ICD-10-CM

## 2024-01-23 DIAGNOSIS — M99.01 CERVICAL SEGMENT DYSFUNCTION: ICD-10-CM

## 2024-01-23 DIAGNOSIS — M99.02 SOMATIC DYSFUNCTION OF THORACIC REGION: ICD-10-CM

## 2024-01-23 DIAGNOSIS — M99.03 SOMATIC DYSFUNCTION OF LUMBAR REGION: Primary | ICD-10-CM

## 2024-01-23 PROCEDURE — 97140 MANUAL THERAPY 1/> REGIONS: CPT | Performed by: CHIROPRACTOR

## 2024-01-23 PROCEDURE — 98941 CHIROPRACT MANJ 3-4 REGIONS: CPT | Performed by: CHIROPRACTOR

## 2024-01-26 ASSESSMENT — ENCOUNTER SYMPTOMS
NAUSEA: 0
DIFFICULTY URINATING: 0
DYSURIA: 0
COLOR CHANGE: 0
SHORTNESS OF BREATH: 0
DIARRHEA: 0
VOMITING: 0
AGITATION: 0
DIZZINESS: 0
FEVER: 0

## 2024-01-26 NOTE — PROGRESS NOTES
Assessment/Plan   Patient's neck and left periscapular pain is consistent with possible mild cervical radiculopathy considering he has slight diminished triceps reflex however his strength is preserved and there are no other neurologic deficits.  Treatment will involve soft tissue and spinal manipulation which he has responded well to in the past.  CS radiograph January 2024 showed mild degenerative changes at C5/6 which appears to correlate with symptoms. Recommended ongoing card and we can obtain C/S MRI if needed if symptoms don't continue to improve or he develops any weakness or more significant neurological deficits.    Visits this year: 4    Subjective   Patient reports feeling much better overall.  Currently endorses 3-4 out of 10 neck pain and tightness.  Had some soreness after last visit locally in the neck but it faded after a couple of days.  In general notes that his left upper extremity has gone away completely.  Feels that some of his neck pain may be from sleeping with his neck turned to the left side while he is lying on his back.  Regardless has not had any severe pain since her last visit.  Has been stretching his neck regularly    HPI - Neck pain 12/26/2023 -patient endorses 1 month insidious onset of neck pain left cervical thoracic pain and periscapular pain with occasional tingling in the left upper extremity and hand.  Symptoms came on without specific injury although he endorses a potentially unrelated issue of the left distal biceps/medial elbow.  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  Notes that his symptoms are exacerbated when extending his neck looking up.  Previously has benefited from chiropractic spinal manipulation but has not had this treatment recently. Prior history of low back pain and herniated disc and sciatic pain but this is currently not bothering him    Review of  Systems   Constitutional:  Negative for fever.   Eyes:  Negative for visual disturbance.   Respiratory:  Negative for shortness of breath.    Cardiovascular:  Negative for chest pain.   Gastrointestinal:  Negative for diarrhea, nausea and vomiting.   Genitourinary:  Negative for difficulty urinating and dysuria.   Skin:  Negative for color change.   Neurological:  Negative for dizziness.   Psychiatric/Behavioral:  Negative for agitation.    All other systems reviewed and are negative.    Objective   Examination findings (e.g., palpation & ROM): Decreased C/S and L/S ROM with pain, hypertonic and tender cervical erectors, L upper trapezius, infraspinatus  -ve ULTT L  Mild reduction L shoulder IR    Segmental joint dysfunction was identified in the following areas using motion palpation and/or pain provocation assessment:  Cervical: 7  Thoracic: 4-10  Lumbopelvic: 1      Physical Exam  Neurological:      Mental Status: He is alert.      Sensory: Sensation is intact.      Motor: Motor function is intact.      Coordination: Coordination is intact.      Gait: Gait is intact.      Deep Tendon Reflexes:      Reflex Scores:       Tricep reflexes are 1+ on the left side.       Bicep reflexes are 2+ on the right side and 2+ on the left side.       Brachioradialis reflexes are 2+ on the right side and 2+ on the left side.       Patellar reflexes are 2+ on the right side and 2+ on the left side.       Achilles reflexes are 2+ on the right side and 2+ on the left side.     Comments: UE str wnl 12/26/2023       Plan   Today's treatment:  SMT to regions of segmental dysfunction identified on exam, using age-appropriate force, and manual diversified technique.   STM to patient tolerance to hypertonic paraspinal muscles  Manual dynamic stretching of the upper trapezius & periscapular muscles BL  2:40 to 2:55 PM  Patient noted reduced pain and improved mobility post-treatment    Treatment Plan:   The patient and I discussed the risks  and benefits of chiropractic care. Based on the patient's subjective complaints along with the examination findings, it is advised that a course of chiropractic treatment by initiated. The patient provided consent for care. The patient tolerated today's treatment with little or no additional discomfort and was instructed to contact the office for questions or concerns. Will see patient once per week then every 2 weeks when symptoms become mild/manageable, further spaced apart contingent upon improvement.     This chart note was generated using dictation software, and as such, there may be typographical errors present. Abbreviations: Cervical spine (CS), cervical-thoracic (CT), Dry needling (DN), Flexion adduction internal rotation (FAIR), high velocity, low amplitude (HVLA), Lumbar spine (LS), Soft tissue manipulation (STM), spinal manipulative therapy (SMT), Straight leg raise (SLR), Thoracic spine (TS).

## 2024-01-30 ENCOUNTER — ALLIED HEALTH (OUTPATIENT)
Dept: INTEGRATIVE MEDICINE | Facility: CLINIC | Age: 44
End: 2024-01-30
Payer: COMMERCIAL

## 2024-01-30 DIAGNOSIS — M99.01 CERVICAL SEGMENT DYSFUNCTION: ICD-10-CM

## 2024-01-30 DIAGNOSIS — M99.02 SOMATIC DYSFUNCTION OF THORACIC REGION: ICD-10-CM

## 2024-01-30 DIAGNOSIS — M99.03 SOMATIC DYSFUNCTION OF LUMBAR REGION: Primary | ICD-10-CM

## 2024-01-30 DIAGNOSIS — M54.12 CERVICAL RADICULOPATHY: ICD-10-CM

## 2024-01-30 DIAGNOSIS — M79.10 MYALGIA: ICD-10-CM

## 2024-01-30 PROCEDURE — 98941 CHIROPRACT MANJ 3-4 REGIONS: CPT | Performed by: CHIROPRACTOR

## 2024-01-30 PROCEDURE — 97112 NEUROMUSCULAR REEDUCATION: CPT | Performed by: CHIROPRACTOR

## 2024-02-02 ASSESSMENT — ENCOUNTER SYMPTOMS
DIARRHEA: 0
NAUSEA: 0
FEVER: 0
VOMITING: 0
SHORTNESS OF BREATH: 0
DIFFICULTY URINATING: 0
DIZZINESS: 0
AGITATION: 0
COLOR CHANGE: 0
DYSURIA: 0

## 2024-02-02 NOTE — PROGRESS NOTES
Assessment/Plan   Patient's neck and left periscapular pain is consistent with possible mild cervical radiculopathy considering he has slight diminished triceps reflex however his strength is preserved and there are no other neurologic deficits.  Treatment will involve soft tissue and spinal manipulation which he has responded well to in the past.  CS radiograph January 2024 showed mild degenerative changes at C5/6 which appears to correlate with symptoms. Recommended ongoing card and we can obtain C/S MRI if needed if symptoms don't continue to improve or he develops any weakness or more significant neurological deficits.    Visits this year: 5    Subjective   Patient reports that he is doing much better.  Felt relief after last session.  Doing some neck traction and stretches at home as advised.  Currently neck pain is 3-4 out of 10 in intensity and intermittent.  Seldom has any radiating pain into the left upper extremity although he does have some localized elbow pain as he did prior.    HPI - Neck pain 12/26/2023 -patient endorses 1 month insidious onset of neck pain left cervical thoracic pain and periscapular pain with occasional tingling in the left upper extremity and hand.  Symptoms came on without specific injury although he endorses a potentially unrelated issue of the left distal biceps/medial elbow.  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  Notes that his symptoms are exacerbated when extending his neck looking up.  Previously has benefited from chiropractic spinal manipulation but has not had this treatment recently. Prior history of low back pain and herniated disc and sciatic pain but this is currently not bothering him    Review of Systems   Constitutional:  Negative for fever.   Eyes:  Negative for visual disturbance.   Respiratory:  Negative for shortness of breath.    Cardiovascular:  Negative  for chest pain.   Gastrointestinal:  Negative for diarrhea, nausea and vomiting.   Genitourinary:  Negative for difficulty urinating and dysuria.   Skin:  Negative for color change.   Neurological:  Negative for dizziness.   Psychiatric/Behavioral:  Negative for agitation.    All other systems reviewed and are negative.    Objective   Examination findings (e.g., palpation & ROM): Decreased C/S and L/S ROM with pain, hypertonic and tender cervical erectors, L upper trapezius, infraspinatus  -ve ULTT L  Mild reduction L shoulder IR    Segmental joint dysfunction was identified in the following areas using motion palpation and/or pain provocation assessment:  Cervical: 7  Thoracic: 4-18  Lumbopelvic: 1      Physical Exam  Neurological:      Mental Status: He is alert.      Sensory: Sensation is intact.      Motor: Motor function is intact.      Coordination: Coordination is intact.      Gait: Gait is intact.      Deep Tendon Reflexes:      Reflex Scores:       Tricep reflexes are 1+ on the left side.       Bicep reflexes are 2+ on the right side and 2+ on the left side.       Brachioradialis reflexes are 2+ on the right side and 2+ on the left side.       Patellar reflexes are 2+ on the right side and 2+ on the left side.       Achilles reflexes are 2+ on the right side and 2+ on the left side.     Comments: UE str wnl 12/26/2023       Plan   Today's treatment:  SMT to regions of segmental dysfunction identified on exam, using age-appropriate force, and manual diversified technique.   STM to patient tolerance to hypertonic paraspinal muscles  Manual dynamic stretching of the upper trapezius & periscapular muscles BL  Dry needling (10 in, 10 out) to region of the chief complaint / hypertonic muscles identified upon palpation in CS erectors  2:40 to 2:55 PM  Patient noted reduced pain and improved mobility post-treatment    Treatment Plan:   The patient and I discussed the risks and benefits of chiropractic care. Based  on the patient's subjective complaints along with the examination findings, it is advised that a course of chiropractic treatment by initiated. The patient provided consent for care. The patient tolerated today's treatment with little or no additional discomfort and was instructed to contact the office for questions or concerns. Will see patient once per week then every 2 weeks when symptoms become mild/manageable, further spaced apart contingent upon improvement.     This chart note was generated using dictation software, and as such, there may be typographical errors present. Abbreviations: Cervical spine (CS), cervical-thoracic (CT), Dry needling (DN), Flexion adduction internal rotation (FAIR), high velocity, low amplitude (HVLA), Lumbar spine (LS), Soft tissue manipulation (STM), spinal manipulative therapy (SMT), Straight leg raise (SLR), Thoracic spine (TS).

## 2024-02-06 ENCOUNTER — ALLIED HEALTH (OUTPATIENT)
Dept: INTEGRATIVE MEDICINE | Facility: CLINIC | Age: 44
End: 2024-02-06
Payer: COMMERCIAL

## 2024-02-06 DIAGNOSIS — M99.01 CERVICAL SEGMENT DYSFUNCTION: ICD-10-CM

## 2024-02-06 DIAGNOSIS — M99.02 SOMATIC DYSFUNCTION OF THORACIC REGION: ICD-10-CM

## 2024-02-06 DIAGNOSIS — M79.10 MYALGIA: ICD-10-CM

## 2024-02-06 DIAGNOSIS — M54.12 CERVICAL RADICULOPATHY: ICD-10-CM

## 2024-02-06 DIAGNOSIS — M99.03 SOMATIC DYSFUNCTION OF LUMBAR REGION: Primary | ICD-10-CM

## 2024-02-06 PROCEDURE — 98941 CHIROPRACT MANJ 3-4 REGIONS: CPT | Performed by: CHIROPRACTOR

## 2024-02-06 PROCEDURE — 97112 NEUROMUSCULAR REEDUCATION: CPT | Performed by: CHIROPRACTOR

## 2024-02-12 ASSESSMENT — ENCOUNTER SYMPTOMS
DYSURIA: 0
COLOR CHANGE: 0
DIFFICULTY URINATING: 0
VOMITING: 0
DIZZINESS: 0
DIARRHEA: 0
FEVER: 0
AGITATION: 0
NAUSEA: 0
SHORTNESS OF BREATH: 0

## 2024-02-12 NOTE — PROGRESS NOTES
Assessment/Plan   Patient's neck and left periscapular pain is consistent with possible mild cervical radiculopathy considering he has slight diminished triceps reflex however his strength is preserved and there are no other neurologic deficits.  Treatment will involve soft tissue and spinal manipulation which he has responded well to in the past.  CS radiograph January 2024 showed mild degenerative changes at C5/6 which appears to correlate with symptoms. Recommended ongoing card and we can obtain C/S MRI if needed if symptoms don't continue to improve or he develops any weakness or more significant neurological deficits.    Visits this year: 6    Subjective   Patient reports that he is doing much better compared to last visit.  Only mild intermittent neck tightness very mild pain.  No recent left upper extremity pain.  Has been stretching the neck regularly as advised.  Has been active with his job which does not exacerbate his symptoms.  Feels that his neck range of motion is also improved. Did have some local soreness in the neck possibly from dry needling last visit which lasted a few days but then now it feels better than before.    HPI - Neck pain 12/26/2023 -patient endorses 1 month insidious onset of neck pain left cervical thoracic pain and periscapular pain with occasional tingling in the left upper extremity and hand.  Symptoms came on without specific injury although he endorses a potentially unrelated issue of the left distal biceps/medial elbow.  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  Notes that his symptoms are exacerbated when extending his neck looking up.  Previously has benefited from chiropractic spinal manipulation but has not had this treatment recently. Prior history of low back pain and herniated disc and sciatic pain but this is currently not bothering him    Review of Systems    Constitutional:  Negative for fever.   Eyes:  Negative for visual disturbance.   Respiratory:  Negative for shortness of breath.    Cardiovascular:  Negative for chest pain.   Gastrointestinal:  Negative for diarrhea, nausea and vomiting.   Genitourinary:  Negative for difficulty urinating and dysuria.   Skin:  Negative for color change.   Neurological:  Negative for dizziness.   Psychiatric/Behavioral:  Negative for agitation.    All other systems reviewed and are negative.    Objective   Examination findings (e.g., palpation & ROM): Decreased C/S and L/S ROM with pain, hypertonic and tender cervical erectors, L upper trapezius  -ve ULTT L  Mild reduction L shoulder IR    Segmental joint dysfunction was identified in the following areas using motion palpation and/or pain provocation assessment:  Cervical: 7  Thoracic: 4-18  Lumbopelvic: 1      Physical Exam  Neurological:      Mental Status: He is alert.      Sensory: Sensation is intact.      Motor: Motor function is intact.      Coordination: Coordination is intact.      Gait: Gait is intact.      Deep Tendon Reflexes:      Reflex Scores:       Tricep reflexes are 1+ on the left side.       Bicep reflexes are 2+ on the right side and 2+ on the left side.       Brachioradialis reflexes are 2+ on the right side and 2+ on the left side.       Patellar reflexes are 2+ on the right side and 2+ on the left side.       Achilles reflexes are 2+ on the right side and 2+ on the left side.     Comments: UE str wnl 12/26/2023       Plan   Today's treatment:  SMT to regions of segmental dysfunction identified on exam, using age-appropriate force, and manual diversified technique.   STM to patient tolerance to hypertonic paraspinal muscles  Manual dynamic stretching of the upper trapezius & periscapular muscles BL  2:41 to 2:56 PM  Patient noted reduced pain and improved mobility post-treatment    Treatment Plan:   The patient and I discussed the risks and benefits of  chiropractic care. Based on the patient's subjective complaints along with the examination findings, it is advised that a course of chiropractic treatment by initiated. The patient provided consent for care. The patient tolerated today's treatment with little or no additional discomfort and was instructed to contact the office for questions or concerns. Will see patient once per week then every 2 weeks when symptoms become mild/manageable, further spaced apart contingent upon improvement.     This chart note was generated using dictation software, and as such, there may be typographical errors present. Abbreviations: Cervical spine (CS), cervical-thoracic (CT), Dry needling (DN), Flexion adduction internal rotation (FAIR), high velocity, low amplitude (HVLA), Lumbar spine (LS), Soft tissue manipulation (STM), spinal manipulative therapy (SMT), Straight leg raise (SLR), Thoracic spine (TS).

## 2024-02-13 ENCOUNTER — ALLIED HEALTH (OUTPATIENT)
Dept: INTEGRATIVE MEDICINE | Facility: CLINIC | Age: 44
End: 2024-02-13
Payer: COMMERCIAL

## 2024-02-13 DIAGNOSIS — M79.10 MYALGIA: ICD-10-CM

## 2024-02-13 DIAGNOSIS — M54.12 CERVICAL RADICULOPATHY: ICD-10-CM

## 2024-02-13 DIAGNOSIS — M99.03 SOMATIC DYSFUNCTION OF LUMBAR REGION: Primary | ICD-10-CM

## 2024-02-13 DIAGNOSIS — M99.02 SOMATIC DYSFUNCTION OF THORACIC REGION: ICD-10-CM

## 2024-02-13 DIAGNOSIS — M99.01 CERVICAL SEGMENT DYSFUNCTION: ICD-10-CM

## 2024-02-13 PROCEDURE — 97112 NEUROMUSCULAR REEDUCATION: CPT | Performed by: CHIROPRACTOR

## 2024-02-13 PROCEDURE — 98941 CHIROPRACT MANJ 3-4 REGIONS: CPT | Performed by: CHIROPRACTOR

## 2024-02-23 ASSESSMENT — ENCOUNTER SYMPTOMS
DIFFICULTY URINATING: 0
DYSURIA: 0
NAUSEA: 0
FEVER: 0
COLOR CHANGE: 0
DIARRHEA: 0
SHORTNESS OF BREATH: 0
DIZZINESS: 0
AGITATION: 0
VOMITING: 0

## 2024-02-23 NOTE — PROGRESS NOTES
Assessment/Plan   Patient's neck and left periscapular pain is consistent with possible mild cervical radiculopathy considering he has slight diminished triceps reflex however his strength is preserved and there are no other neurologic deficits.  Treatment will involve soft tissue and spinal manipulation which he has responded well to in the past.  CS radiograph January 2024 showed mild degenerative changes at C5/6 which appears to correlate with symptoms. Recommended ongoing card and we can obtain C/S MRI if needed if symptoms don't continue to improve or he develops any weakness or more significant neurological deficits.    Visits this year: 7    Subjective   Patient reports that he is doing much better compared to last visit.  Only mild intermittent neck tightness very mild pain.  No recent left upper extremity pain.  Has been stretching the neck regularly as advised.  Has been active with his job which does not exacerbate his symptoms.  Feels that his neck range of motion is also improved. Did have some local soreness in the neck possibly from dry needling last visit which lasted a few days but then now it feels better than before.    HPI - Neck pain 12/26/2023 -patient endorses 1 month insidious onset of neck pain left cervical thoracic pain and periscapular pain with occasional tingling in the left upper extremity and hand.  Symptoms came on without specific injury although he endorses a potentially unrelated issue of the left distal biceps/medial elbow.  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  Notes that his symptoms are exacerbated when extending his neck looking up.  Previously has benefited from chiropractic spinal manipulation but has not had this treatment recently. Prior history of low back pain and herniated disc and sciatic pain but this is currently not bothering him    Review of Systems    Constitutional:  Negative for fever.   Eyes:  Negative for visual disturbance.   Respiratory:  Negative for shortness of breath.    Cardiovascular:  Negative for chest pain.   Gastrointestinal:  Negative for diarrhea, nausea and vomiting.   Genitourinary:  Negative for difficulty urinating and dysuria.   Skin:  Negative for color change.   Neurological:  Negative for dizziness.   Psychiatric/Behavioral:  Negative for agitation.    All other systems reviewed and are negative.    Objective   Examination findings (e.g., palpation & ROM): Decreased C/S and L/S ROM with pain, hypertonic and tender cervical erectors, L upper trapezius  -ve ULTT L  Mild reduction L shoulder IR    Segmental joint dysfunction was identified in the following areas using motion palpation and/or pain provocation assessment:  Cervical: 7  Thoracic: 4-18  Lumbopelvic: 1      Physical Exam  Neurological:      Mental Status: He is alert.      Sensory: Sensation is intact.      Motor: Motor function is intact.      Coordination: Coordination is intact.      Gait: Gait is intact.      Deep Tendon Reflexes:      Reflex Scores:       Tricep reflexes are 1+ on the left side.       Bicep reflexes are 2+ on the right side and 2+ on the left side.       Brachioradialis reflexes are 2+ on the right side and 2+ on the left side.       Patellar reflexes are 2+ on the right side and 2+ on the left side.       Achilles reflexes are 2+ on the right side and 2+ on the left side.     Comments: UE str wnl 12/26/2023       Plan   Today's treatment:  SMT to regions of segmental dysfunction identified on exam, using age-appropriate force, and manual diversified technique.   STM to patient tolerance to hypertonic paraspinal muscles  Manual dynamic stretching of the upper trapezius & periscapular muscles BL  2:41 to 2:56 PM  Patient noted reduced pain and improved mobility post-treatment    Treatment Plan:   The patient and I discussed the risks and benefits of  chiropractic care. Based on the patient's subjective complaints along with the examination findings, it is advised that a course of chiropractic treatment by initiated. The patient provided consent for care. The patient tolerated today's treatment with little or no additional discomfort and was instructed to contact the office for questions or concerns. Will see patient once per week then every 2 weeks when symptoms become mild/manageable, further spaced apart contingent upon improvement.     This chart note was generated using dictation software, and as such, there may be typographical errors present. Abbreviations: Cervical spine (CS), cervical-thoracic (CT), Dry needling (DN), Flexion adduction internal rotation (FAIR), high velocity, low amplitude (HVLA), Lumbar spine (LS), Soft tissue manipulation (STM), spinal manipulative therapy (SMT), Straight leg raise (SLR), Thoracic spine (TS).

## 2024-02-27 ENCOUNTER — APPOINTMENT (OUTPATIENT)
Dept: INTEGRATIVE MEDICINE | Facility: CLINIC | Age: 44
End: 2024-02-27
Payer: COMMERCIAL

## 2024-03-04 ASSESSMENT — ENCOUNTER SYMPTOMS
DYSURIA: 0
DIARRHEA: 0
AGITATION: 0
FEVER: 0
DIFFICULTY URINATING: 0
DIZZINESS: 0
NAUSEA: 0
COLOR CHANGE: 0
SHORTNESS OF BREATH: 0
VOMITING: 0

## 2024-03-04 NOTE — PROGRESS NOTES
Assessment/Plan   Patient's neck and left periscapular pain is consistent with possible mild cervical radiculopathy considering he has slight diminished triceps reflex however his strength is preserved and there are no other neurologic deficits.  Treatment will involve soft tissue and spinal manipulation which he has responded well to in the past.  CS radiograph January 2024 showed mild degenerative changes at C5/6 which appears to correlate with symptoms. Recommended ongoing card and we can obtain C/S MRI if needed if symptoms don't continue to improve or he develops any weakness or more significant neurological deficits.    Visits this year: 7    Subjective   Patient reports much improvement noting that the neck has not been bothering him much only with mild tightness intermittently.  Notes however that he had aggravation of right-sided lower back pain after shoveling snow last weekend and is having mild frequent localized pain and tightness in the right side of the lower back.  No radiating pain numbness or tingling in the lower extremities    HPI - Neck pain 12/26/2023 -patient endorses 1 month insidious onset of neck pain left cervical thoracic pain and periscapular pain with occasional tingling in the left upper extremity and hand.  Symptoms came on without specific injury although he endorses a potentially unrelated issue of the left distal biceps/medial elbow.  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  Notes that his symptoms are exacerbated when extending his neck looking up.  Previously has benefited from chiropractic spinal manipulation but has not had this treatment recently. Prior history of low back pain and herniated disc and sciatic pain but this is currently not bothering him    Review of Systems   Constitutional:  Negative for fever.   Eyes:  Negative for visual disturbance.   Respiratory:  Negative  for shortness of breath.    Cardiovascular:  Negative for chest pain.   Gastrointestinal:  Negative for diarrhea, nausea and vomiting.   Genitourinary:  Negative for difficulty urinating and dysuria.   Skin:  Negative for color change.   Neurological:  Negative for dizziness.   Psychiatric/Behavioral:  Negative for agitation.    All other systems reviewed and are negative.    Objective   Examination findings (e.g., palpation & ROM): Decreased C/S and L/S ROM with pain, hypertonic and tender cervical erectors, and R LS erectors and QL, L upper trapezius    Segmental joint dysfunction was identified in the following areas using motion palpation and/or pain provocation assessment:  Cervical: 7  Thoracic: 4-18  Lumbopelvic: 1, BL SIJ      Physical Exam  Neurological:      Mental Status: He is alert.      Sensory: Sensation is intact.      Motor: Motor function is intact.      Coordination: Coordination is intact.      Gait: Gait is intact.      Deep Tendon Reflexes:      Reflex Scores:       Tricep reflexes are 1+ on the left side.       Bicep reflexes are 2+ on the right side and 2+ on the left side.       Brachioradialis reflexes are 2+ on the right side and 2+ on the left side.       Patellar reflexes are 2+ on the right side and 2+ on the left side.       Achilles reflexes are 2+ on the right side and 2+ on the left side.     Comments: UE str wnl 12/26/2023       Plan   Today's treatment:  SMT to regions of segmental dysfunction identified on exam, using age-appropriate force, and manual diversified technique.   STM to patient tolerance to hypertonic paraspinal muscles  Manual dynamic stretching of the upper trapezius & periscapular muscles BL  2:40 to 2:55 PM  Patient noted reduced pain and improved mobility post-treatment    Treatment Plan:   The patient and I discussed the risks and benefits of chiropractic care. Based on the patient's subjective complaints along with the examination findings, it is advised that  a course of chiropractic treatment by initiated. The patient provided consent for care. The patient tolerated today's treatment with little or no additional discomfort and was instructed to contact the office for questions or concerns. Will see patient once per week then every 2 weeks when symptoms become mild/manageable, further spaced apart contingent upon improvement.     This chart note was generated using dictation software, and as such, there may be typographical errors present. Abbreviations: Cervical spine (CS), cervical-thoracic (CT), Dry needling (DN), Flexion adduction internal rotation (FAIR), high velocity, low amplitude (HVLA), Lumbar spine (LS), Soft tissue manipulation (STM), spinal manipulative therapy (SMT), Straight leg raise (SLR), Thoracic spine (TS).

## 2024-03-05 ENCOUNTER — ALLIED HEALTH (OUTPATIENT)
Dept: INTEGRATIVE MEDICINE | Facility: CLINIC | Age: 44
End: 2024-03-05
Payer: COMMERCIAL

## 2024-03-05 DIAGNOSIS — M54.12 CERVICAL RADICULOPATHY: ICD-10-CM

## 2024-03-05 DIAGNOSIS — M99.02 SOMATIC DYSFUNCTION OF THORACIC REGION: ICD-10-CM

## 2024-03-05 DIAGNOSIS — M99.01 CERVICAL SEGMENT DYSFUNCTION: ICD-10-CM

## 2024-03-05 DIAGNOSIS — M99.03 SOMATIC DYSFUNCTION OF LUMBAR REGION: Primary | ICD-10-CM

## 2024-03-05 DIAGNOSIS — M79.10 MYALGIA: ICD-10-CM

## 2024-03-05 PROCEDURE — 97112 NEUROMUSCULAR REEDUCATION: CPT | Performed by: CHIROPRACTOR

## 2024-03-05 PROCEDURE — 98941 CHIROPRACT MANJ 3-4 REGIONS: CPT | Performed by: CHIROPRACTOR

## 2024-03-22 ASSESSMENT — ENCOUNTER SYMPTOMS
SHORTNESS OF BREATH: 0
VOMITING: 0
DIARRHEA: 0
COLOR CHANGE: 0
NAUSEA: 0
FEVER: 0
DIFFICULTY URINATING: 0
DYSURIA: 0
DIZZINESS: 0
AGITATION: 0

## 2024-03-22 NOTE — PROGRESS NOTES
Assessment/Plan   Patient's neck and left periscapular pain is consistent with possible mild cervical radiculopathy considering he has slight diminished triceps reflex however his strength is preserved and there are no other neurologic deficits.  Treatment will involve soft tissue and spinal manipulation which he has responded well to in the past.  CS radiograph January 2024 showed mild degenerative changes at C5/6 which appears to correlate with symptoms. Recommended ongoing card and we can obtain C/S MRI if needed if symptoms don't continue to improve or he develops any weakness or more significant neurological deficits.    Visits this year: 9    Subjective   Patient reports that he has been doing very well lately.  Only mild occasional pain and tightness in the neck.  No radiating pain numbness or tingling.  Low back has also been doing better.  Had to miss her last appointment due to working overtime.  Has been doing physical work though and this has not increased his symptoms    HPI - Neck pain 12/26/2023 -patient endorses 1 month insidious onset of neck pain left cervical thoracic pain and periscapular pain with occasional tingling in the left upper extremity and hand.  Symptoms came on without specific injury although he endorses a potentially unrelated issue of the left distal biceps/medial elbow.  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  Notes that his symptoms are exacerbated when extending his neck looking up.  Previously has benefited from chiropractic spinal manipulation but has not had this treatment recently. Prior history of low back pain and herniated disc and sciatic pain but this is currently not bothering him    Review of Systems   Constitutional:  Negative for fever.   Eyes:  Negative for visual disturbance.   Respiratory:  Negative for shortness of breath.    Cardiovascular:  Negative for chest  pain.   Gastrointestinal:  Negative for diarrhea, nausea and vomiting.   Genitourinary:  Negative for difficulty urinating and dysuria.   Skin:  Negative for color change.   Neurological:  Negative for dizziness.   Psychiatric/Behavioral:  Negative for agitation.    All other systems reviewed and are negative.    Objective   Examination findings (e.g., palpation & ROM): Decreased C/S and L/S ROM with pain, hypertonic and tender cervical erectors, and R LS erectors and QL, L upper trapezius  BL Cheryle IR 35    Segmental joint dysfunction was identified in the following areas using motion palpation and/or pain provocation assessment:  Cervical: 7  Thoracic: 4-8  Lumbopelvic: 1, BL SIJ      Physical Exam  Neurological:      Mental Status: He is alert.      Sensory: Sensation is intact.      Motor: Motor function is intact.      Coordination: Coordination is intact.      Gait: Gait is intact.      Deep Tendon Reflexes:      Reflex Scores:       Tricep reflexes are 1+ on the left side.       Bicep reflexes are 2+ on the right side and 2+ on the left side.       Brachioradialis reflexes are 2+ on the right side and 2+ on the left side.       Patellar reflexes are 2+ on the right side and 2+ on the left side.       Achilles reflexes are 2+ on the right side and 2+ on the left side.     Comments: UE str wnl 12/26/2023       Plan   Today's treatment:  SMT to regions of segmental dysfunction identified on exam, using age-appropriate force, and manual diversified technique.   STM to patient tolerance to hypertonic paraspinal muscles  Manual dynamic stretching of the upper trapezius & periscapular muscles BL  2:40 to 2:55 PM  Patient noted reduced pain and improved mobility post-treatment    Treatment Plan:   The patient and I discussed the risks and benefits of chiropractic care. Based on the patient's subjective complaints along with the examination findings, it is advised that a course of chiropractic treatment by initiated.  The patient provided consent for care. The patient tolerated today's treatment with little or no additional discomfort and was instructed to contact the office for questions or concerns. Will see patient once per week then every 2 weeks when symptoms become mild/manageable, further spaced apart contingent upon improvement.     This chart note was generated using dictation software, and as such, there may be typographical errors present. Abbreviations: Cervical spine (CS), cervical-thoracic (CT), Dry needling (DN), Flexion adduction internal rotation (FAIR), high velocity, low amplitude (HVLA), Lumbar spine (LS), Soft tissue manipulation (STM), spinal manipulative therapy (SMT), Straight leg raise (SLR), Thoracic spine (TS).

## 2024-03-26 ENCOUNTER — ALLIED HEALTH (OUTPATIENT)
Dept: INTEGRATIVE MEDICINE | Facility: CLINIC | Age: 44
End: 2024-03-26
Payer: COMMERCIAL

## 2024-03-26 DIAGNOSIS — M79.10 MYALGIA: ICD-10-CM

## 2024-03-26 DIAGNOSIS — M99.03 SOMATIC DYSFUNCTION OF LUMBAR REGION: Primary | ICD-10-CM

## 2024-03-26 DIAGNOSIS — M99.01 CERVICAL SEGMENT DYSFUNCTION: ICD-10-CM

## 2024-03-26 DIAGNOSIS — M54.12 CERVICAL RADICULOPATHY: ICD-10-CM

## 2024-03-26 DIAGNOSIS — M99.02 SOMATIC DYSFUNCTION OF THORACIC REGION: ICD-10-CM

## 2024-03-26 PROCEDURE — 97112 NEUROMUSCULAR REEDUCATION: CPT | Performed by: CHIROPRACTOR

## 2024-03-26 PROCEDURE — 98941 CHIROPRACT MANJ 3-4 REGIONS: CPT | Performed by: CHIROPRACTOR

## 2024-04-05 ASSESSMENT — ENCOUNTER SYMPTOMS
AGITATION: 0
NAUSEA: 0
DIARRHEA: 0
DIFFICULTY URINATING: 0
VOMITING: 0
DYSURIA: 0
DIZZINESS: 0
COLOR CHANGE: 0
SHORTNESS OF BREATH: 0
FEVER: 0
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
## 2024-04-09 ENCOUNTER — ALLIED HEALTH (OUTPATIENT)
Dept: INTEGRATIVE MEDICINE | Facility: CLINIC | Age: 44
End: 2024-04-09
Payer: COMMERCIAL

## 2024-04-09 DIAGNOSIS — M79.10 MYALGIA: ICD-10-CM

## 2024-04-09 DIAGNOSIS — M99.01 CERVICAL SEGMENT DYSFUNCTION: ICD-10-CM

## 2024-04-09 DIAGNOSIS — M54.12 CERVICAL RADICULOPATHY: ICD-10-CM

## 2024-04-09 DIAGNOSIS — M99.03 SOMATIC DYSFUNCTION OF LUMBAR REGION: Primary | ICD-10-CM

## 2024-04-09 DIAGNOSIS — M99.02 SOMATIC DYSFUNCTION OF THORACIC REGION: ICD-10-CM

## 2024-04-09 PROCEDURE — 97112 NEUROMUSCULAR REEDUCATION: CPT | Performed by: CHIROPRACTOR

## 2024-04-09 PROCEDURE — 99212 OFFICE O/P EST SF 10 MIN: CPT | Performed by: CHIROPRACTOR

## 2024-04-09 PROCEDURE — 98941 CHIROPRACT MANJ 3-4 REGIONS: CPT | Performed by: CHIROPRACTOR

## 2024-04-19 ASSESSMENT — ENCOUNTER SYMPTOMS
DIFFICULTY URINATING: 0
FEVER: 0
DIARRHEA: 0
NAUSEA: 0
DYSURIA: 0
SHORTNESS OF BREATH: 0
DIZZINESS: 0
COLOR CHANGE: 0
VOMITING: 0
AGITATION: 0

## 2024-04-19 NOTE — PROGRESS NOTES
Assessment/Plan   Patient's neck and left periscapular pain is consistent with possible mild cervical radiculopathy considering he has slight diminished triceps reflex however his strength is preserved and there are no other neurologic deficits.  Treatment will involve soft tissue and spinal manipulation which he has responded well to in the past.  CS radiograph January 2024 showed mild degenerative changes at C5/6 which appears to correlate with symptoms. Recommended ongoing card and we can obtain C/S MRI if needed if symptoms don't continue to improve or he develops any weakness or more significant neurological deficits.    Visits this year: 11    Subjective   Patient reports he was having moderate pain in the right gluteal region and lower back but this was brief last week and possibly caused by doing yard work but also working overtime.  Symptoms have since improved to mild pain that is intermittent in that area.  Neck pain is mild localized and intermittent as well.  No radiating pain in distal extremities    HPI - Neck pain 12/26/2023 -patient endorses 1 month insidious onset of neck pain left cervical thoracic pain and periscapular pain with occasional tingling in the left upper extremity and hand.  Symptoms came on without specific injury although he endorses a potentially unrelated issue of the left distal biceps/medial elbow.  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  Notes that his symptoms are exacerbated when extending his neck looking up.  Previously has benefited from chiropractic spinal manipulation but has not had this treatment recently. Prior history of low back pain and herniated disc and sciatic pain but this is currently not bothering him    LBP 4/23/2024 - Endorses chronic occasional LBP. Previously had more severe symptoms years prior which resolved with chiropractic care.     Review of  Systems   Constitutional:  Negative for fever.   Eyes:  Negative for visual disturbance.   Respiratory:  Negative for shortness of breath.    Cardiovascular:  Negative for chest pain.   Gastrointestinal:  Negative for diarrhea, nausea and vomiting.   Genitourinary:  Negative for difficulty urinating and dysuria.   Skin:  Negative for color change.   Neurological:  Negative for dizziness.   Psychiatric/Behavioral:  Negative for agitation.    All other systems reviewed and are negative.    Objective   Examination findings (e.g., palpation & ROM): Decreased C/S and L/S ROM with pain, hypertonic and tender cervical erectors, and R LS erectors and QL, L upper trapezius  Pain R FAIR R gluteal region    Segmental joint dysfunction was identified in the following areas using motion palpation and/or pain provocation assessment:  Cervical: 7  Thoracic: 4-9  Lumbopelvic: 1, BL SIJ      Physical Exam  Neurological:      Mental Status: He is alert.      Sensory: Sensation is intact.      Motor: Motor function is intact.      Coordination: Coordination is intact.      Gait: Gait is intact.      Deep Tendon Reflexes:      Reflex Scores:       Tricep reflexes are 2+ on the right side and 2+ on the left side.       Bicep reflexes are 2+ on the right side and 2+ on the left side.       Brachioradialis reflexes are 2+ on the right side and 2+ on the left side.       Patellar reflexes are 2+ on the right side and 2+ on the left side.       Achilles reflexes are 2+ on the right side and 2+ on the left side.     Comments: 4/23/24       Plan   Today's treatment:  SMT to regions of segmental dysfunction identified on exam, using age-appropriate force, and manual diversified technique.   STM to patient tolerance to hypertonic paraspinal muscles  Manual dynamic stretching of the upper trapezius & periscapular muscles BL  2:41 to 2:57 PM  Patient noted reduced pain and improved mobility post-treatment     Treatment Plan:   The patient and I  discussed the risks and benefits of chiropractic care. Based on the patient's subjective complaints along with the examination findings, it is advised that a course of chiropractic treatment by initiated. The patient provided consent for care. The patient tolerated today's treatment with little or no additional discomfort and was instructed to contact the office for questions or concerns. Will see patient once per week then every 2 weeks when symptoms become mild/manageable, further spaced apart contingent upon improvement.     This chart note was generated using dictation software, and as such, there may be typographical errors present. Abbreviations: Cervical spine (CS), cervical-thoracic (CT), Dry needling (DN), Flexion adduction internal rotation (FAIR), high velocity, low amplitude (HVLA), Lumbar spine (LS), Soft tissue manipulation (STM), spinal manipulative therapy (SMT), Straight leg raise (SLR), Thoracic spine (TS).

## 2024-04-23 ENCOUNTER — ALLIED HEALTH (OUTPATIENT)
Dept: INTEGRATIVE MEDICINE | Facility: CLINIC | Age: 44
End: 2024-04-23
Payer: COMMERCIAL

## 2024-04-23 DIAGNOSIS — M99.01 CERVICAL SEGMENT DYSFUNCTION: ICD-10-CM

## 2024-04-23 DIAGNOSIS — M99.03 SOMATIC DYSFUNCTION OF LUMBAR REGION: Primary | ICD-10-CM

## 2024-04-23 DIAGNOSIS — M79.10 MYALGIA: ICD-10-CM

## 2024-04-23 DIAGNOSIS — M54.12 CERVICAL RADICULOPATHY: ICD-10-CM

## 2024-04-23 DIAGNOSIS — M99.02 SOMATIC DYSFUNCTION OF THORACIC REGION: ICD-10-CM

## 2024-04-23 PROCEDURE — 98941 CHIROPRACT MANJ 3-4 REGIONS: CPT | Performed by: CHIROPRACTOR

## 2024-04-23 PROCEDURE — 97140 MANUAL THERAPY 1/> REGIONS: CPT | Performed by: CHIROPRACTOR

## 2024-05-03 ASSESSMENT — ENCOUNTER SYMPTOMS
NAUSEA: 0
DIZZINESS: 0
DYSURIA: 0
SHORTNESS OF BREATH: 0
FEVER: 0
VOMITING: 0
DIFFICULTY URINATING: 0
DIARRHEA: 0
AGITATION: 0
COLOR CHANGE: 0

## 2024-05-03 NOTE — PROGRESS NOTES
Assessment/Plan   Patient's neck and left periscapular pain is consistent with possible mild cervical radiculopathy considering he has slight diminished triceps reflex however his strength is preserved and there are no other neurologic deficits.  Treatment will involve soft tissue and spinal manipulation which he has responded well to in the past.  CS radiograph January 2024 showed mild degenerative changes at C5/6 which appears to correlate with symptoms. Recommended ongoing card and we can obtain C/S MRI if needed if symptoms don't continue to improve or he develops any weakness or more significant neurological deficits. L wrist pain may be overuse but does not appear significant injury.    Visits this year: 12    Subjective   Patient reports mild pain and stiffness throughout the neck mid and lower back.  No moderate or severe pain or radiating pain numbness or tingling.  Generally feels stiff and sore from working and just finished his ninth day of consecutive work and is going to work a few more.  Also mild pain and stiffness in the left wrist and denies any specific trauma or injury to the wrist    HPI - Neck pain 12/26/2023 -patient endorses 1 month insidious onset of neck pain left cervical thoracic pain and periscapular pain with occasional tingling in the left upper extremity and hand.  Symptoms came on without specific injury although he endorses a potentially unrelated issue of the left distal biceps/medial elbow.  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  Notes that his symptoms are exacerbated when extending his neck looking up.  Previously has benefited from chiropractic spinal manipulation but has not had this treatment recently. Prior history of low back pain and herniated disc and sciatic pain but this is currently not bothering him    LBP 4/23/2024 - Endorses chronic occasional LBP. Previously had  more severe symptoms years prior which resolved with chiropractic care.     Review of Systems   Constitutional:  Negative for fever.   Eyes:  Negative for visual disturbance.   Respiratory:  Negative for shortness of breath.    Cardiovascular:  Negative for chest pain.   Gastrointestinal:  Negative for diarrhea, nausea and vomiting.   Genitourinary:  Negative for difficulty urinating and dysuria.   Skin:  Negative for color change.   Neurological:  Negative for dizziness.   Psychiatric/Behavioral:  Negative for agitation.    All other systems reviewed and are negative.    Objective   Examination findings (e.g., palpation & ROM): Decreased C/S and L/S ROM with pain, hypertonic and tender cervical erectors, and R LS erectors and QL, L upper trapezius  Pain R FAIR R gluteal region  Mild intercarpal tenderness L wrist, no increase with passive ROM    Segmental joint dysfunction was identified in the following areas using motion palpation and/or pain provocation assessment:  Cervical: 7  Thoracic: 4-9  Lumbopelvic: 1, BL SIJ      Physical Exam  Neurological:      Mental Status: He is alert.      Sensory: Sensation is intact.      Motor: Motor function is intact.      Coordination: Coordination is intact.      Gait: Gait is intact.      Deep Tendon Reflexes:      Reflex Scores:       Tricep reflexes are 2+ on the right side and 2+ on the left side.       Bicep reflexes are 2+ on the right side and 2+ on the left side.       Brachioradialis reflexes are 2+ on the right side and 2+ on the left side.       Patellar reflexes are 2+ on the right side and 2+ on the left side.       Achilles reflexes are 2+ on the right side and 2+ on the left side.     Comments: 4/23/24       Plan   Today's treatment:  SMT to regions of segmental dysfunction identified on exam, using age-appropriate force, and manual diversified technique.   STM to patient tolerance to hypertonic paraspinal muscles  Manual dynamic stretching of the upper  trapezius & periscapular muscles BL  Kinesiotaped L wrist  2:42 to 2:59  Patient noted reduced pain and improved mobility post-treatment     Treatment Plan:   The patient and I discussed the risks and benefits of chiropractic care. Based on the patient's subjective complaints along with the examination findings, it is advised that a course of chiropractic treatment by initiated. The patient provided consent for care. The patient tolerated today's treatment with little or no additional discomfort and was instructed to contact the office for questions or concerns. Will see patient once per week then every 2 weeks when symptoms become mild/manageable, further spaced apart contingent upon improvement.     This chart note was generated using dictation software, and as such, there may be typographical errors present. Abbreviations: Cervical spine (CS), cervical-thoracic (CT), Dry needling (DN), Flexion adduction internal rotation (FAIR), high velocity, low amplitude (HVLA), Lumbar spine (LS), Soft tissue manipulation (STM), spinal manipulative therapy (SMT), Straight leg raise (SLR), Thoracic spine (TS).

## 2024-05-07 ENCOUNTER — ALLIED HEALTH (OUTPATIENT)
Dept: INTEGRATIVE MEDICINE | Facility: CLINIC | Age: 44
End: 2024-05-07
Payer: COMMERCIAL

## 2024-05-07 DIAGNOSIS — M79.10 MYALGIA: ICD-10-CM

## 2024-05-07 DIAGNOSIS — M99.03 SOMATIC DYSFUNCTION OF LUMBAR REGION: Primary | ICD-10-CM

## 2024-05-07 DIAGNOSIS — M54.12 CERVICAL RADICULOPATHY: ICD-10-CM

## 2024-05-07 DIAGNOSIS — M99.02 SOMATIC DYSFUNCTION OF THORACIC REGION: ICD-10-CM

## 2024-05-07 DIAGNOSIS — M99.01 CERVICAL SEGMENT DYSFUNCTION: ICD-10-CM

## 2024-05-07 PROCEDURE — 97140 MANUAL THERAPY 1/> REGIONS: CPT | Performed by: CHIROPRACTOR

## 2024-05-07 PROCEDURE — 98941 CHIROPRACT MANJ 3-4 REGIONS: CPT | Performed by: CHIROPRACTOR

## 2024-05-17 ASSESSMENT — ENCOUNTER SYMPTOMS
AGITATION: 0
DIZZINESS: 0
DIARRHEA: 0
SHORTNESS OF BREATH: 0
VOMITING: 0
COLOR CHANGE: 0
FEVER: 0
DIFFICULTY URINATING: 0
NAUSEA: 0
DYSURIA: 0

## 2024-05-17 NOTE — PROGRESS NOTES
Assessment/Plan   Patient's neck and left periscapular pain is consistent with possible mild cervical radiculopathy considering he has slight diminished triceps reflex however his strength is preserved and there are no other neurologic deficits.  Treatment will involve soft tissue and spinal manipulation which he has responded well to in the past.  CS radiograph January 2024 showed mild degenerative changes at C5/6 which appears to correlate with symptoms. Recommended ongoing card and we can obtain C/S MRI if needed if symptoms don't continue to improve or he develops any weakness or more significant neurological deficits. L wrist pain may be overuse and tendinitis/osis of wrist extensors but does not appear significant injury.    Visits this year: 13    Subjective   Patient reports mild pain and tightness in the neck and lower back intermittently.  Has ongoing left wrist pain. No radiating symptoms from neck into extremities.     Left wrist pain 5/21/24 -patient endorses 1 month history of left wrist pain which was initially in the middle of the wrist but now is more in the lateral aspect of the wrist.  Has been aggravated by working using his hands.  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  Denies any specific trauma or injury. Pain typically mild. Has improved since our previous visit when he first noted it.    HPI - Neck pain 12/26/2023 -patient endorses 1 month insidious onset of neck pain left cervical thoracic pain and periscapular pain with occasional tingling in the left upper extremity and hand.  Symptoms came on without specific injury although he endorses a potentially unrelated issue of the left distal biceps/medial elbow.  He works in [employer] building and engine part which is repetitive and involves the use of his arms as the object weighs 30+ pounds.  Notes that his  symptoms are exacerbated when extending his neck looking up.  Previously has benefited from chiropractic spinal manipulation but has not had this treatment recently. Prior history of low back pain and herniated disc and sciatic pain but this is currently not bothering him    LBP 4/23/2024 - Endorses chronic occasional LBP. Previously had more severe symptoms years prior which resolved with chiropractic care.     Review of Systems   Constitutional:  Negative for fever.   Eyes:  Negative for visual disturbance.   Respiratory:  Negative for shortness of breath.    Cardiovascular:  Negative for chest pain.   Gastrointestinal:  Negative for diarrhea, nausea and vomiting.   Genitourinary:  Negative for difficulty urinating and dysuria.   Skin:  Negative for color change.   Neurological:  Negative for dizziness.   Psychiatric/Behavioral:  Negative for agitation.    All other systems reviewed and are negative.    Objective   Examination findings (e.g., palpation & ROM): Decreased C/S and L/S ROM with pain, hypertonic and tender cervical erectors, and R LS erectors and QL, L upper trapezius  Pain R FAIR R gluteal region  Mild intercarpal tenderness L wrist and HTN/tender L brachioradialis & wrist extensors, no increase with passive ROM    Segmental joint dysfunction was identified in the following areas using motion palpation and/or pain provocation assessment:  Cervical: 7  Thoracic: 4-9  Lumbopelvic: 1, BL SIJ      Physical Exam  Neurological:      Mental Status: He is alert.      Sensory: Sensation is intact.      Motor: Motor function is intact.      Coordination: Coordination is intact.      Gait: Gait is intact.      Deep Tendon Reflexes:      Reflex Scores:       Tricep reflexes are 2+ on the right side and 2+ on the left side.       Bicep reflexes are 2+ on the right side and 2+ on the left side.       Brachioradialis reflexes are 2+ on the right side and 2+ on the left side.       Patellar reflexes are 2+ on the  right side and 2+ on the left side.       Achilles reflexes are 2+ on the right side and 2+ on the left side.     Comments: 4/23/24       Plan   Today's treatment:  SMT to regions of segmental dysfunction identified on exam, using age-appropriate force, and manual diversified technique.   Dynamic stretching upper trapezius  STM to patient tolerance to hypertonic paraspinal muscles  STM to the L wrist extensors  2:41 to 2:59 PM  Patient noted reduced pain and improved mobility post-treatment     Treatment Plan:   The patient and I discussed the risks and benefits of chiropractic care. Based on the patient's subjective complaints along with the examination findings, it is advised that a course of chiropractic treatment by initiated. The patient provided consent for care. The patient tolerated today's treatment with little or no additional discomfort and was instructed to contact the office for questions or concerns. Will see patient once per week then every 2 weeks when symptoms become mild/manageable, further spaced apart contingent upon improvement.     This chart note was generated using dictation software, and as such, there may be typographical errors present. Abbreviations: Cervical spine (CS), cervical-thoracic (CT), Dry needling (DN), Flexion adduction internal rotation (FAIR), high velocity, low amplitude (HVLA), Lumbar spine (LS), Soft tissue manipulation (STM), spinal manipulative therapy (SMT), Straight leg raise (SLR), Thoracic spine (TS).

## 2024-05-21 ENCOUNTER — ALLIED HEALTH (OUTPATIENT)
Dept: INTEGRATIVE MEDICINE | Facility: CLINIC | Age: 44
End: 2024-05-21
Payer: COMMERCIAL

## 2024-05-21 DIAGNOSIS — M99.01 CERVICAL SEGMENT DYSFUNCTION: ICD-10-CM

## 2024-05-21 DIAGNOSIS — M99.02 SOMATIC DYSFUNCTION OF THORACIC REGION: ICD-10-CM

## 2024-05-21 DIAGNOSIS — M79.10 MYALGIA: ICD-10-CM

## 2024-05-21 DIAGNOSIS — M67.834 OTHER SPECIFIED DISORDERS OF TENDON, LEFT WRIST: ICD-10-CM

## 2024-05-21 DIAGNOSIS — M99.03 SOMATIC DYSFUNCTION OF LUMBAR REGION: Primary | ICD-10-CM

## 2024-05-21 DIAGNOSIS — M54.12 CERVICAL RADICULOPATHY: ICD-10-CM

## 2024-05-21 PROCEDURE — 98941 CHIROPRACT MANJ 3-4 REGIONS: CPT | Performed by: CHIROPRACTOR

## 2024-05-21 PROCEDURE — 97112 NEUROMUSCULAR REEDUCATION: CPT | Performed by: CHIROPRACTOR

## 2024-05-31 ASSESSMENT — ENCOUNTER SYMPTOMS
SHORTNESS OF BREATH: 0
DIFFICULTY URINATING: 0
VOMITING: 0
DIARRHEA: 0
FEVER: 0
DYSURIA: 0
DIZZINESS: 0
NAUSEA: 0
AGITATION: 0
COLOR CHANGE: 0

## 2024-05-31 NOTE — PROGRESS NOTES
Assessment/Plan   Patient's neck and left periscapular pain is consistent with possible mild cervical radiculopathy considering he has slight diminished triceps reflex however his strength is preserved and there are no other neurologic deficits.  Treatment will involve soft tissue and spinal manipulation which he has responded well to in the past.  CS radiograph January 2024 showed mild degenerative changes at C5/6 which appears to correlate with symptoms. Recommended ongoing card and we can obtain C/S MRI if needed if symptoms don't continue to improve or he develops any weakness or more significant neurological deficits. L wrist pain may be overuse and tendinitis/osis of wrist extensors but does not appear significant injury.    Visits this year: 14    Subjective   Patient reports that his neck mid back and low back pain are doing relatively good with only mild intermittent pain in those areas.  He did have a flare of mid back pain a couple days ago but it subsided on its own.  Notes ongoing left wrist pain and some tightness in the left forearm.  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 No specific trauma, rather, overuse.    Left wrist pain 5/21/24 -patient endorses 1 month history of left wrist pain which was initially in the middle of the wrist but now is more in the lateral aspect of the wrist.  Has been aggravated by working using his hands.  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  Denies any specific trauma or injury. Pain typically mild. Has improved since our previous visit when he first noted it.    HPI - Neck pain 12/26/2023 -patient endorses 1 month insidious onset of neck pain left cervical thoracic pain and periscapular pain with occasional tingling in  the left upper extremity and hand.  Symptoms came on without specific injury although he endorses a potentially unrelated issue of the left distal biceps/medial elbow.  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  Notes that his symptoms are exacerbated when extending his neck looking up.  Previously has benefited from chiropractic spinal manipulation but has not had this treatment recently. Prior history of low back pain and herniated disc and sciatic pain but this is currently not bothering him    LBP 4/23/2024 - Endorses chronic occasional LBP. Previously had more severe symptoms years prior which resolved with chiropractic care.     Review of Systems   Constitutional:  Negative for fever.   Eyes:  Negative for visual disturbance.   Respiratory:  Negative for shortness of breath.    Cardiovascular:  Negative for chest pain.   Gastrointestinal:  Negative for diarrhea, nausea and vomiting.   Genitourinary:  Negative for difficulty urinating and dysuria.   Skin:  Negative for color change.   Neurological:  Negative for dizziness.   Psychiatric/Behavioral:  Negative for agitation.    All other systems reviewed and are negative.    Objective   Examination findings (e.g., palpation & ROM): Decreased C/S and L/S ROM with pain, hypertonic and tender cervical erectors, and R LS erectors and QL, L upper trapezius  SLR BL 60  Mild intercarpal tenderness L wrist and HTN/tender L brachioradialis & wrist extensors, no increase with passive ROM    Segmental joint dysfunction was identified in the following areas using motion palpation and/or pain provocation assessment:  Cervical: 7  Thoracic: 4-9  Lumbopelvic: 1, BL SIJ      Physical Exam  Neurological:      Mental Status: He is alert.      Sensory: Sensation is intact.      Motor: Motor function is intact.      Coordination: Coordination is intact.      Gait: Gait is intact.       Deep Tendon Reflexes:      Reflex Scores:       Tricep reflexes are 2+ on the right side and 2+ on the left side.       Bicep reflexes are 2+ on the right side and 2+ on the left side.       Brachioradialis reflexes are 2+ on the right side and 2+ on the left side.       Patellar reflexes are 2+ on the right side and 2+ on the left side.       Achilles reflexes are 2+ on the right side and 2+ on the left side.     Comments: 4/23/24       Plan   Today's treatment:  SMT to regions of segmental dysfunction identified on exam, using age-appropriate force, and manual diversified technique.   Dry needling (10 in, 10 out) to region of the chief complaint / hypertonic muscles identified upon palpation in L forearm  Dynamic stretching upper trapezius  STM to patient tolerance to hypertonic paraspinal muscles and wrist extensors  2:42 to 2:59 pm  Patient noted reduced pain and improved mobility post-treatment     Treatment Plan:   The patient and I discussed the risks and benefits of chiropractic care. Based on the patient's subjective complaints along with the examination findings, it is advised that a course of chiropractic treatment by initiated. The patient provided consent for care. The patient tolerated today's treatment with little or no additional discomfort and was instructed to contact the office for questions or concerns. Will see patient once per week then every 2 weeks when symptoms become mild/manageable, further spaced apart contingent upon improvement.     This chart note was generated using dictation software, and as such, there may be typographical errors present. Abbreviations: Cervical spine (CS), cervical-thoracic (CT), Dry needling (DN), Flexion adduction internal rotation (FAIR), high velocity, low amplitude (HVLA), Lumbar spine (LS), Soft tissue manipulation (STM), spinal manipulative therapy (SMT), Straight leg raise (SLR), Thoracic spine (TS).

## 2024-06-04 ENCOUNTER — ALLIED HEALTH (OUTPATIENT)
Dept: INTEGRATIVE MEDICINE | Facility: CLINIC | Age: 44
End: 2024-06-04
Payer: COMMERCIAL

## 2024-06-04 DIAGNOSIS — M54.12 CERVICAL RADICULOPATHY: ICD-10-CM

## 2024-06-04 DIAGNOSIS — M99.02 SOMATIC DYSFUNCTION OF THORACIC REGION: ICD-10-CM

## 2024-06-04 DIAGNOSIS — M79.10 MYALGIA: ICD-10-CM

## 2024-06-04 DIAGNOSIS — M99.03 SOMATIC DYSFUNCTION OF LUMBAR REGION: Primary | ICD-10-CM

## 2024-06-04 DIAGNOSIS — M99.01 CERVICAL SEGMENT DYSFUNCTION: ICD-10-CM

## 2024-06-04 PROCEDURE — 98941 CHIROPRACT MANJ 3-4 REGIONS: CPT | Performed by: CHIROPRACTOR

## 2024-06-04 PROCEDURE — 97112 NEUROMUSCULAR REEDUCATION: CPT | Performed by: CHIROPRACTOR

## 2024-06-14 ASSESSMENT — ENCOUNTER SYMPTOMS
DIFFICULTY URINATING: 0
AGITATION: 0
COLOR CHANGE: 0
DIZZINESS: 0
DYSURIA: 0
VOMITING: 0
DIARRHEA: 0
FEVER: 0
SHORTNESS OF BREATH: 0
NAUSEA: 0

## 2024-06-14 NOTE — PROGRESS NOTES
Assessment/Plan   Patient's neck and left periscapular pain is consistent with possible mild cervical radiculopathy considering he has slight diminished triceps reflex however his strength is preserved and there are no other neurologic deficits.  Treatment will involve soft tissue and spinal manipulation which he has responded well to in the past.  CS radiograph January 2024 showed mild degenerative changes at C5/6 which appears to correlate with symptoms. Recommended ongoing card and we can obtain C/S MRI if needed if symptoms don't continue to improve or he develops any weakness or more significant neurological deficits. L wrist pain may be overuse and tendinitis/osis of wrist extensors but does not appear significant injury, also considering 1st CMC arthritis, will obtain radiographs.    Visits this year: 14    Subjective   Patient reports that neck and low back pain have improved with only mild occasional pain and tightness however he has ongoing left wrist and base of thumb pain.  No particular trauma or injury although he was burned on the dorsum of the wrist by a marshmallow that was fairly mild and is healing.  Pain in the thumb is triggered by exertion and he has limited range of motion and flexibility of the thumb and wrist. Normally can do pushups but recently cannot due to pain/limited wrist/thumb movement.    Left wrist pain 5/21/24 -patient endorses 1 month history of left wrist pain which was initially in the middle of the wrist but now is more in the lateral aspect of the wrist.  Has been aggravated by working using his hands.  He works in [employer] line and is repetitively using the left hand for several hours per day and up to 9 or 10 days in a row as he is working a lot of overtime.  Denies any specific trauma or injury. Pain typically mild. Has improved since our previous visit when he first noted it.    HPI - Neck pain 12/26/2023 -patient endorses 1 month insidious onset of neck pain left  cervical thoracic pain and periscapular pain with occasional tingling in the left upper extremity and hand.  Symptoms came on without specific injury although he endorses a potentially unrelated issue of the left distal biceps/medial elbow.  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  Notes that his symptoms are exacerbated when extending his neck looking up.  Previously has benefited from chiropractic spinal manipulation but has not had this treatment recently. Prior history of low back pain and herniated disc and sciatic pain but this is currently not bothering him    LBP 4/23/2024 - Endorses chronic occasional LBP. Previously had more severe symptoms years prior which resolved with chiropractic care.     Review of Systems   Constitutional:  Negative for fever.   Eyes:  Negative for visual disturbance.   Respiratory:  Negative for shortness of breath.    Cardiovascular:  Negative for chest pain.   Gastrointestinal:  Negative for diarrhea, nausea and vomiting.   Genitourinary:  Negative for difficulty urinating and dysuria.   Skin:  Negative for color change.   Neurological:  Negative for dizziness.   Psychiatric/Behavioral:  Negative for agitation.    All other systems reviewed and are negative.    Objective   Examination findings (e.g., palpation & ROM): Decreased C/S and L/S ROM with pain, hypertonic and tender cervical erectors, and R LS erectors and QL, L upper trapezius  SLR BL 60  Mild intercarpal tenderness L wrist and HTN/tender L brachioradialis & wrist extensors, no increase with passive ROM  Tender L 1st CMC joint    Segmental joint dysfunction was identified in the following areas using motion palpation and/or pain provocation assessment:  Cervical: 7  Thoracic: 4-9  Lumbopelvic: 1, BL SIJ      Physical Exam  Neurological:      Mental Status: He is alert.      Sensory: Sensation is intact.      Motor:  Motor function is intact.      Coordination: Coordination is intact.      Gait: Gait is intact.      Deep Tendon Reflexes:      Reflex Scores:       Tricep reflexes are 2+ on the right side and 2+ on the left side.       Bicep reflexes are 2+ on the right side and 2+ on the left side.       Brachioradialis reflexes are 2+ on the right side and 2+ on the left side.       Patellar reflexes are 2+ on the right side and 2+ on the left side.       Achilles reflexes are 2+ on the right side and 2+ on the left side.     Comments: 4/23/24       Plan   Today's treatment:  SMT to regions of segmental dysfunction identified on exam, using age-appropriate force, and manual diversified technique.   Dynamic stretching upper trapezius  STM to patient tolerance to hypertonic paraspinal muscles and wrist extensors and mobilization left 1st CMC  2:41 to 2:56 PM  Patient noted reduced pain and improved mobility post-treatment     Treatment Plan:   The patient and I discussed the risks and benefits of chiropractic care. Based on the patient's subjective complaints along with the examination findings, it is advised that a course of chiropractic treatment by initiated. The patient provided consent for care. The patient tolerated today's treatment with little or no additional discomfort and was instructed to contact the office for questions or concerns. Will see patient once per week then every 2 weeks when symptoms become mild/manageable, further spaced apart contingent upon improvement.     This chart note was generated using dictation software, and as such, there may be typographical errors present. Abbreviations: Cervical spine (CS), cervical-thoracic (CT), Dry needling (DN), Flexion adduction internal rotation (FAIR), high velocity, low amplitude (HVLA), Lumbar spine (LS), Soft tissue manipulation (STM), spinal manipulative therapy (SMT), Straight leg raise (SLR), Thoracic spine (TS).

## 2024-06-18 ENCOUNTER — APPOINTMENT (OUTPATIENT)
Dept: INTEGRATIVE MEDICINE | Facility: CLINIC | Age: 44
End: 2024-06-18
Payer: COMMERCIAL

## 2024-06-18 DIAGNOSIS — M79.10 MYALGIA: ICD-10-CM

## 2024-06-18 DIAGNOSIS — M99.03 SOMATIC DYSFUNCTION OF LUMBAR REGION: Primary | ICD-10-CM

## 2024-06-18 DIAGNOSIS — M54.12 CERVICAL RADICULOPATHY: ICD-10-CM

## 2024-06-18 DIAGNOSIS — M99.02 SOMATIC DYSFUNCTION OF THORACIC REGION: ICD-10-CM

## 2024-06-18 DIAGNOSIS — M99.01 CERVICAL SEGMENT DYSFUNCTION: ICD-10-CM

## 2024-06-18 DIAGNOSIS — M18.9 ARTHROSIS OF FIRST CARPOMETACARPAL JOINT: ICD-10-CM

## 2024-06-18 DIAGNOSIS — M67.834 OTHER SPECIFIED DISORDERS OF TENDON, LEFT WRIST: ICD-10-CM

## 2024-06-18 PROCEDURE — 97112 NEUROMUSCULAR REEDUCATION: CPT | Performed by: CHIROPRACTOR

## 2024-06-18 PROCEDURE — 98941 CHIROPRACT MANJ 3-4 REGIONS: CPT | Performed by: CHIROPRACTOR

## 2024-06-28 ASSESSMENT — ENCOUNTER SYMPTOMS
AGITATION: 0
FEVER: 0
SHORTNESS OF BREATH: 0
DIZZINESS: 0
VOMITING: 0
DYSURIA: 0
NAUSEA: 0
COLOR CHANGE: 0
DIARRHEA: 0
DIFFICULTY URINATING: 0

## 2024-06-28 NOTE — PROGRESS NOTES
Assessment/Plan   Patient's neck and left periscapular pain is consistent with possible mild cervical radiculopathy considering he has slight diminished triceps reflex however his strength is preserved and there are no other neurologic deficits.  Treatment will involve soft tissue and spinal manipulation which he has responded well to in the past.  CS radiograph January 2024 showed mild degenerative changes at C5/6 which appears to correlate with symptoms. Recommended ongoing card and we can obtain C/S MRI if needed if symptoms don't continue to improve or he develops any weakness or more significant neurological deficits. L wrist pain may be overuse and tendinitis/osis of wrist extensors but does not appear significant injury, also considering 1st CMC arthritis, will obtain radiographs.    Visits this year: 15    Subjective   Patient reports maintenance of improvement of neck pain, only occasional mild pain there. Has some R sided LBP today locally, mild but frequent, began after exercising yesterday, was on floor on his back for exercises. Notes ongoing L base of thumb/wrist pain. Some pain opening jars also pain with extending thumb.    Left wrist pain 5/21/24 -patient endorses 1 month history of left wrist pain which was initially in the middle of the wrist but now is more in the lateral aspect of the wrist.  Has been aggravated by working using his hands.  He works in [employer] line and is repetitively using the left hand for several hours per day and up to 9 or 10 days in a row as he is working a lot of overtime.  Denies any specific trauma or injury. Pain typically mild. Has improved since our previous visit when he first noted it.    HPI - Neck pain 12/26/2023 -patient endorses 1 month insidious onset of neck pain left cervical thoracic pain and periscapular pain with occasional tingling in the left upper extremity and hand.  Symptoms came on without specific injury although he endorses a potentially  unrelated issue of the left distal biceps/medial elbow.  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  Notes that his symptoms are exacerbated when extending his neck looking up.  Previously has benefited from chiropractic spinal manipulation but has not had this treatment recently. Prior history of low back pain and herniated disc and sciatic pain but this is currently not bothering him    LBP 4/23/2024 - Endorses chronic occasional LBP. Previously had more severe symptoms years prior which resolved with chiropractic care.     Review of Systems   Constitutional:  Negative for fever.   Eyes:  Negative for visual disturbance.   Respiratory:  Negative for shortness of breath.    Cardiovascular:  Negative for chest pain.   Gastrointestinal:  Negative for diarrhea, nausea and vomiting.   Genitourinary:  Negative for difficulty urinating and dysuria.   Skin:  Negative for color change.   Neurological:  Negative for dizziness.   Psychiatric/Behavioral:  Negative for agitation.    All other systems reviewed and are negative.    Objective   Examination findings (e.g., palpation & ROM): Decreased C/S and L/S ROM with pain, hypertonic and tender cervical erectors, and R LS erectors and QL, L upper trapezius  SLR BL 65  Mild intercarpal tenderness L wrist and HTN/tender L brachioradialis & wrist extensors, no increase with passive ROM  Tender L 1st CMC joint    Segmental joint dysfunction was identified in the following areas using motion palpation and/or pain provocation assessment:  Cervical: 7  Thoracic: 4-9  Lumbopelvic: 1, BL SIJ      Physical Exam  Neurological:      Mental Status: He is alert.      Sensory: Sensation is intact.      Motor: Motor function is intact.      Coordination: Coordination is intact.      Gait: Gait is intact.      Deep Tendon Reflexes:      Reflex Scores:       Tricep reflexes are 2+ on the right  side and 2+ on the left side.       Bicep reflexes are 2+ on the right side and 2+ on the left side.       Brachioradialis reflexes are 2+ on the right side and 2+ on the left side.       Patellar reflexes are 2+ on the right side and 2+ on the left side.       Achilles reflexes are 2+ on the right side and 2+ on the left side.     Comments: 4/23/24       Plan   Today's treatment:  SMT to regions of segmental dysfunction identified on exam, using age-appropriate force, and manual diversified technique.   Dynamic stretching upper trapezius  STM to patient tolerance to hypertonic paraspinal muscles and wrist extensors and mobilization left 1st CMC  11:25 to 11:40 AM  Patient noted reduced pain and improved mobility post-treatment     Treatment Plan:   The patient and I discussed the risks and benefits of chiropractic care. Based on the patient's subjective complaints along with the examination findings, it is advised that a course of chiropractic treatment by initiated. The patient provided consent for care. The patient tolerated today's treatment with little or no additional discomfort and was instructed to contact the office for questions or concerns. Will see patient once per week then every 2 weeks when symptoms become mild/manageable, further spaced apart contingent upon improvement.     This chart note was generated using dictation software, and as such, there may be typographical errors present. Abbreviations: Cervical spine (CS), cervical-thoracic (CT), Dry needling (DN), Flexion adduction internal rotation (FAIR), high velocity, low amplitude (HVLA), Lumbar spine (LS), Soft tissue manipulation (STM), spinal manipulative therapy (SMT), Straight leg raise (SLR), Thoracic spine (TS).

## 2024-07-01 ENCOUNTER — HOSPITAL ENCOUNTER (OUTPATIENT)
Dept: RADIOLOGY | Facility: CLINIC | Age: 44
Discharge: HOME | End: 2024-07-01
Payer: COMMERCIAL

## 2024-07-01 DIAGNOSIS — M18.9 ARTHROSIS OF FIRST CARPOMETACARPAL JOINT: ICD-10-CM

## 2024-07-01 PROCEDURE — 73110 X-RAY EXAM OF WRIST: CPT | Mod: LT

## 2024-07-01 PROCEDURE — 73110 X-RAY EXAM OF WRIST: CPT | Mod: LEFT SIDE | Performed by: RADIOLOGY

## 2024-07-02 ENCOUNTER — APPOINTMENT (OUTPATIENT)
Dept: INTEGRATIVE MEDICINE | Facility: CLINIC | Age: 44
End: 2024-07-02
Payer: COMMERCIAL

## 2024-07-02 DIAGNOSIS — M18.9 ARTHROSIS OF FIRST CARPOMETACARPAL JOINT: ICD-10-CM

## 2024-07-02 DIAGNOSIS — M54.12 CERVICAL RADICULOPATHY: ICD-10-CM

## 2024-07-02 DIAGNOSIS — M99.03 SOMATIC DYSFUNCTION OF LUMBAR REGION: Primary | ICD-10-CM

## 2024-07-02 DIAGNOSIS — M67.834 OTHER SPECIFIED DISORDERS OF TENDON, LEFT WRIST: ICD-10-CM

## 2024-07-02 DIAGNOSIS — M79.10 MYALGIA: ICD-10-CM

## 2024-07-02 DIAGNOSIS — M99.01 CERVICAL SEGMENT DYSFUNCTION: ICD-10-CM

## 2024-07-02 DIAGNOSIS — M99.02 SOMATIC DYSFUNCTION OF THORACIC REGION: ICD-10-CM

## 2024-07-23 ENCOUNTER — APPOINTMENT (OUTPATIENT)
Dept: INTEGRATIVE MEDICINE | Facility: CLINIC | Age: 44
End: 2024-07-23
Payer: COMMERCIAL

## 2024-07-29 ENCOUNTER — HOSPITAL ENCOUNTER (OUTPATIENT)
Dept: RADIOLOGY | Facility: CLINIC | Age: 44
Discharge: HOME | End: 2024-07-29
Payer: COMMERCIAL

## 2024-07-29 ENCOUNTER — OFFICE VISIT (OUTPATIENT)
Dept: ORTHOPEDIC SURGERY | Facility: CLINIC | Age: 44
End: 2024-07-29
Payer: COMMERCIAL

## 2024-07-29 VITALS — HEIGHT: 69 IN | BODY MASS INDEX: 26.66 KG/M2 | WEIGHT: 180 LBS

## 2024-07-29 DIAGNOSIS — M18.9 ARTHROSIS OF FIRST CARPOMETACARPAL JOINT: ICD-10-CM

## 2024-07-29 DIAGNOSIS — M65.4 DE QUERVAIN'S DISEASE (TENOSYNOVITIS): Primary | ICD-10-CM

## 2024-07-29 DIAGNOSIS — M67.834 OTHER SPECIFIED DISORDERS OF TENDON, LEFT WRIST: ICD-10-CM

## 2024-07-29 PROCEDURE — 99214 OFFICE O/P EST MOD 30 MIN: CPT | Performed by: ORTHOPAEDIC SURGERY

## 2024-07-29 PROCEDURE — 1036F TOBACCO NON-USER: CPT | Performed by: ORTHOPAEDIC SURGERY

## 2024-07-29 PROCEDURE — 2500000005 HC RX 250 GENERAL PHARMACY W/O HCPCS: Performed by: ORTHOPAEDIC SURGERY

## 2024-07-29 PROCEDURE — 99204 OFFICE O/P NEW MOD 45 MIN: CPT | Performed by: ORTHOPAEDIC SURGERY

## 2024-07-29 PROCEDURE — 2500000004 HC RX 250 GENERAL PHARMACY W/ HCPCS (ALT 636 FOR OP/ED): Performed by: ORTHOPAEDIC SURGERY

## 2024-07-29 PROCEDURE — 3008F BODY MASS INDEX DOCD: CPT | Performed by: ORTHOPAEDIC SURGERY

## 2024-07-29 PROCEDURE — 73140 X-RAY EXAM OF FINGER(S): CPT | Mod: LEFT SIDE | Performed by: RADIOLOGY

## 2024-07-29 PROCEDURE — 73140 X-RAY EXAM OF FINGER(S): CPT | Mod: LT

## 2024-07-29 PROCEDURE — 20550 NJX 1 TENDON SHEATH/LIGAMENT: CPT | Mod: LT | Performed by: ORTHOPAEDIC SURGERY

## 2024-07-29 RX ORDER — LIDOCAINE HYDROCHLORIDE 10 MG/ML
1 INJECTION INFILTRATION; PERINEURAL
Status: COMPLETED | OUTPATIENT
Start: 2024-07-29 | End: 2024-07-29

## 2024-07-29 NOTE — PROGRESS NOTES
7/29/2024    Chief Complaint   Patient presents with    Left Hand - Pain, Tendonitis     Thumb cmc   Xrays today       History of Present Illness:  Patient Aguila Jerry , 44 y.o. male, presents today, 7/29/2024, for evaluation of left hand and wrist pain.  He describes radial sided left wrist pain that radiates into the thumb and sometimes up into the forearm for about the last month or so.  He denies any discrete injury or trauma.  He is right-hand dominant dividual but works a job that requires him to be fairly ambidextrous.  He describes occasional swelling and difficulties with gripping and squeezing.  He saw his chiropractor and attempted to do some adjustment on this that was only minimally beneficial.  He recommended he see a hand specialist because he felt he may have some degree of tendinitis or early arthritic change.       Review of Systems:   GENERAL: Negative  GI: Negative  MUSCULOSKELETAL: See HPI  SKIN: Negative  NEURO:  Negative     Physical Exam:  GENERAL:  Alert and oriented to person, place, and time.  No acute distress and breathing comfortably; pleasant and cooperative with the examination.  HEENT:  Head is normocephalic and atraumatic.  NECK:  Supple, no visible swelling.  CARDIOVASCULAR:  No palpable tachycardia.  LUNGS:  No audible wheezing or labored breathing.  ABDOMEN:  Nondistended.  Extremities: Evaluation of left upper extremity finds the patient to have a palpable radial artery at the wrist with brisk capillary refill to all digits. The patient has intact sensorium to axillary, radial, median and ulnar nerves. There are no open wounds. There are no signs of infection. There is no evidence of lymphedema or lymphatic streaking. The patient has supple compartments of the left arm, forearm and hand.  Tender to palpation over the first dorsal compartment of the left wrist.  Positive Finkelstein's maneuver and pain with resisted thumb extension.     Imaging/Test Results:  3 views of the  thumb taken in the office today show no acute fracture or dislocation.  Adequate alignment all planes.     Assessment:  Left de Quervain's tenosynovitis.     Plan:  Operative and nonoperative treatment strategies were discussed. Recommendations were made for initial nonoperative management by way of Kenalog injection into the first dorsal compartment of the wrist.  This was performed in office today by Dr. Green and tolerated with the patient.  Will give them formal therapy requisition for course of occupational therapy for de Quervain's protocols.  The importance of therapy protocols and adherence to this regimen for completeness of symptom resolution and healing was stressed.  Patient can continue weightbearing activities to tolerance in the interim.  Follow-up with our office again in 6-8 weeks for repeat clinical exam.  All questions answered at today's visit.        Hand / UE Inj/Asp: L extensor compartment 1 for de Quervain's tenosynovitis on 7/29/2024 3:05 PM  Indications: pain and tendon swelling  Details: 25 G needle, radial approach  Medications: 10 mg triamcinolone acetonide 10 mg/mL; 1 mL lidocaine 10 mg/mL (1 %)  Outcome: tolerated well, no immediate complications    Left DeQuervain´s Injection: It was explained to the patient that the risks of a steroid injection include but are not limited to infection, local skin irritation, skin atrophy, calcification, continued pain and discomfort, elevation of blood sugar, burning, failure to relieve pain, and possible late infection. The patient verbalized good insight and verbalized consent for the injection. It was further explained that post injection discomfort can be alleviated with additional medications, ice, elevation, and rest over the first 24 hours, and these modalities are recommended.     Using aseptic technique, a solution containing 10 mg of Kenalog and 1 mL of 1% lidocaine without epinephrine was injected into the left wrist first dorsal  compartment tendon sheath. This was done by palpating the radial styloid, and with active participation from the patient we were able to localize the tendons of the first dorsal compartment. The skin was prepped and the needle slowly advanced until the needle tip was in the tendon sheath. The solution was then slowly administered and the patient tolerated it well. A band-aid was placed. It should be noted that ethyl chloride spray was used to make the injection delivery more comfortable for the patient.  Procedure, treatment alternatives, risks and benefits explained, specific risks discussed. Consent was given by the patient. Immediately prior to procedure a time out was called to verify the correct patient, procedure, equipment, support staff and site/side marked as required. Patient was prepped and draped in the usual sterile fashion.       In a face to face encounter, I performed a history and physical examination, discussed pertinent diagnostic studies if indicated, and discussed diagnosis and management strategies with both the patient and the mid-level provider. I reviewed the mid-level's note and agree with the documented findings and plan of care.  Patient presents today for evaluation of left radial wrist pain.  No discrete injury.  Symptoms ongoing for the last month.  Right-hand-dominant.  Otherwise healthy.  Positive Finkelstein's maneuver.  Treatment options were discussed.  Patient elects for steroid injection formal therapy and 8-week follow-up.  No x-rays upon return.

## 2024-07-30 ASSESSMENT — ENCOUNTER SYMPTOMS
DIFFICULTY URINATING: 0
COLOR CHANGE: 0
DIZZINESS: 0
DYSURIA: 0
DIARRHEA: 0
AGITATION: 0
NAUSEA: 0
VOMITING: 0
SHORTNESS OF BREATH: 0
FEVER: 0

## 2024-07-30 NOTE — PROGRESS NOTES
Assessment/Plan   Patient's neck and left periscapular pain is consistent with possible mild cervical radiculopathy considering he has slight diminished triceps reflex however his strength is preserved and there are no other neurologic deficits.  Treatment will involve soft tissue and spinal manipulation which he has responded well to in the past.  CS radiograph January 2024 showed mild degenerative changes at C5/6 which appears to correlate with symptoms. Recommended ongoing card and we can obtain C/S MRI if needed if symptoms don't continue to improve or he develops any weakness or more significant neurological deficits.     Visits this year: 16    Subjective   Patient reports doing overall relatively well he does endorse mild intermittent localized low back pain and tightness and mild occasional neck pain and tightness.  Had an episode of moderate low back pain more on R lumbosacral region, at the beginning of this week which faded away after doing some stretches and extension based lumbar stretches in particular.  Denies any recent trauma or injury.  Notes that his left thumb and wrist is feeling much improved after recent injection with orthopedics    Left wrist pain 5/21/24 -patient endorses 1 month history of left wrist pain which was initially in the middle of the wrist but now is more in the lateral aspect of the wrist.  Has been aggravated by working using his hands.  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  Denies any specific trauma or injury. Pain typically mild. Has improved since our previous visit when he first noted it.    HPI - Neck pain 12/26/2023 -patient endorses 1 month insidious onset of neck pain left cervical thoracic pain and periscapular pain with occasional tingling in the left upper extremity and hand.  Symptoms came on without specific injury although he endorses a  potentially unrelated issue of the left distal biceps/medial elbow.  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  Notes that his symptoms are exacerbated when extending his neck looking up.  Previously has benefited from chiropractic spinal manipulation but has not had this treatment recently. Prior history of low back pain and herniated disc and sciatic pain but this is currently not bothering him    LBP 4/23/2024 - Endorses chronic occasional LBP. Previously had more severe symptoms years prior which resolved with chiropractic care.     Review of Systems   Constitutional:  Negative for fever.   Eyes:  Negative for visual disturbance.   Respiratory:  Negative for shortness of breath.    Cardiovascular:  Negative for chest pain.   Gastrointestinal:  Negative for diarrhea, nausea and vomiting.   Genitourinary:  Negative for difficulty urinating and dysuria.   Skin:  Negative for color change.   Neurological:  Negative for dizziness.   Psychiatric/Behavioral:  Negative for agitation.    All other systems reviewed and are negative.    Objective   Examination findings (e.g., palpation & ROM): Decreased C/S and L/S ROM with pain, hypertonic and tender cervical erectors, and R LS erectors and QL, L upper trapezius  SLR BL 65  -ve FAIR BL    Segmental joint dysfunction was identified in the following areas using motion palpation and/or pain provocation assessment:  Cervical: 7  Thoracic: 4-9  Lumbopelvic: 1, BL SIJ      Physical Exam  Neurological:      Mental Status: He is alert.      Sensory: Sensation is intact.      Motor: Motor function is intact.      Coordination: Coordination is intact.      Gait: Gait is intact.      Deep Tendon Reflexes:      Reflex Scores:       Tricep reflexes are 2+ on the right side and 2+ on the left side.       Bicep reflexes are 2+ on the right side and 2+ on the left side.        Brachioradialis reflexes are 2+ on the right side and 2+ on the left side.       Patellar reflexes are 2+ on the right side and 2+ on the left side.       Achilles reflexes are 2+ on the right side and 2+ on the left side.     Comments: 4/23/24       Plan   Today's treatment:  SMT to regions of segmental dysfunction identified on exam, using age-appropriate force, and manual diversified technique.   Manual dynamic stretching of the gluteal muscles, hamstrings, upper trapezius  STM to patient tolerance to hypertonic paraspinal muscles   2:41 to 2:56 PM  Patient noted reduced pain and improved mobility post-treatment     Treatment Plan:   The patient and I discussed the risks and benefits of chiropractic care. Based on the patient's subjective complaints along with the examination findings, it is advised that a course of chiropractic treatment by initiated. The patient provided consent for care. The patient tolerated today's treatment with little or no additional discomfort and was instructed to contact the office for questions or concerns. Will see patient once per week then every 2 weeks when symptoms become mild/manageable, further spaced apart contingent upon improvement.     This chart note was generated using dictation software, and as such, there may be typographical errors present. Abbreviations: Cervical spine (CS), cervical-thoracic (CT), Dry needling (DN), Flexion adduction internal rotation (FAIR), high velocity, low amplitude (HVLA), Lumbar spine (LS), Soft tissue manipulation (STM), spinal manipulative therapy (SMT), Straight leg raise (SLR), Thoracic spine (TS).

## 2024-07-31 ENCOUNTER — APPOINTMENT (OUTPATIENT)
Dept: INTEGRATIVE MEDICINE | Facility: CLINIC | Age: 44
End: 2024-07-31
Payer: COMMERCIAL

## 2024-07-31 DIAGNOSIS — M79.10 MYALGIA: ICD-10-CM

## 2024-07-31 DIAGNOSIS — M99.05 SOMATIC DYSFUNCTION OF PELVIS REGION: ICD-10-CM

## 2024-07-31 DIAGNOSIS — M99.01 CERVICAL SEGMENT DYSFUNCTION: ICD-10-CM

## 2024-07-31 DIAGNOSIS — M54.50 CHRONIC BILATERAL LOW BACK PAIN WITHOUT SCIATICA: ICD-10-CM

## 2024-07-31 DIAGNOSIS — M99.03 SOMATIC DYSFUNCTION OF LUMBAR REGION: Primary | ICD-10-CM

## 2024-07-31 DIAGNOSIS — M99.02 SOMATIC DYSFUNCTION OF THORACIC REGION: ICD-10-CM

## 2024-07-31 DIAGNOSIS — G89.29 CHRONIC BILATERAL LOW BACK PAIN WITHOUT SCIATICA: ICD-10-CM

## 2024-07-31 PROCEDURE — 98941 CHIROPRACT MANJ 3-4 REGIONS: CPT | Performed by: CHIROPRACTOR

## 2024-07-31 PROCEDURE — 97112 NEUROMUSCULAR REEDUCATION: CPT | Performed by: CHIROPRACTOR

## 2024-08-09 ASSESSMENT — ENCOUNTER SYMPTOMS
DIFFICULTY URINATING: 0
COLOR CHANGE: 0
DYSURIA: 0
VOMITING: 0
DIZZINESS: 0
SHORTNESS OF BREATH: 0
DIARRHEA: 0
NAUSEA: 0
AGITATION: 0
FEVER: 0

## 2024-08-09 NOTE — PROGRESS NOTES
Assessment/Plan   Patient's neck and left periscapular pain is consistent with possible mild cervical radiculopathy considering he has slight diminished triceps reflex however his strength is preserved and there are no other neurologic deficits.  Treatment will involve soft tissue and spinal manipulation which he has responded well to in the past.  CS radiograph January 2024 showed mild degenerative changes at C5/6 which appears to correlate with symptoms. Recommended ongoing card and we can obtain C/S MRI if needed if symptoms don't continue to improve or he develops any weakness or more significant neurological deficits.     Visits this year: 17    Subjective   Patient reports that he has been doing fairly well since her last visit only with occasional episodes of lower back pain.  Feels like lower back pain is typically mild when it comes on but it can be moderately intense however episodes have been brief and short-lived and tend to go away on their own.  Currently endorses mild tightness in the lower back.  His neck has been feeling much better.  No radiating pain numbness or tingling in the extremities. L wrist is much improved    Left wrist pain 5/21/24 -patient endorses 1 month history of left wrist pain which was initially in the middle of the wrist but now is more in the lateral aspect of the wrist.  Has been aggravated by working using his hands.  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  Denies any specific trauma or injury. Pain typically mild. Has improved since our previous visit when he first noted it.    HPI - Neck pain 12/26/2023 -patient endorses 1 month insidious onset of neck pain left cervical thoracic pain and periscapular pain with occasional tingling in the left upper extremity and hand.  Symptoms came on without specific injury although he endorses a potentially unrelated  issue of the left distal biceps/medial elbow.  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  Notes that his symptoms are exacerbated when extending his neck looking up.  Previously has benefited from chiropractic spinal manipulation but has not had this treatment recently. Prior history of low back pain and herniated disc and sciatic pain but this is currently not bothering him    LBP 4/23/2024 - Endorses chronic occasional LBP. Previously had more severe symptoms years prior which resolved with chiropractic care.     Review of Systems   Constitutional:  Negative for fever.   Eyes:  Negative for visual disturbance.   Respiratory:  Negative for shortness of breath.    Cardiovascular:  Negative for chest pain.   Gastrointestinal:  Negative for diarrhea, nausea and vomiting.   Genitourinary:  Negative for difficulty urinating and dysuria.   Skin:  Negative for color change.   Neurological:  Negative for dizziness.   Psychiatric/Behavioral:  Negative for agitation.    All other systems reviewed and are negative.    Objective   Examination findings (e.g., palpation & ROM): Decreased C/S and L/S ROM with pain, hypertonic and tender cervical erectors, and R LS erectors and R QL  SLR BL 65  -ve FAIR BL    Segmental joint dysfunction was identified in the following areas using motion palpation and/or pain provocation assessment:  Cervical: 7  Thoracic: 4-9  Lumbopelvic: 1, BL SIJ      Physical Exam  Neurological:      Mental Status: He is alert.      Sensory: Sensation is intact.      Motor: Motor function is intact.      Coordination: Coordination is intact.      Gait: Gait is intact.      Deep Tendon Reflexes:      Reflex Scores:       Tricep reflexes are 2+ on the right side and 2+ on the left side.       Bicep reflexes are 2+ on the right side and 2+ on the left side.       Brachioradialis reflexes are 2+ on the right side  and 2+ on the left side.       Patellar reflexes are 2+ on the right side and 2+ on the left side.       Achilles reflexes are 2+ on the right side and 2+ on the left side.     Comments: 4/23/24       Plan   Today's treatment:  SMT to regions of segmental dysfunction identified on exam, using age-appropriate force, and manual diversified technique.   Dry needling (10 in, 10 out) to region of the chief complaint / hypertonic muscles identified upon palpation in LS erectors  Manual dynamic stretching of the gluteal muscles, hamstrings, upper trapezius  STM to patient tolerance to hypertonic paraspinal muscles   2:41 to 2:56 PM  Patient noted reduced pain and improved mobility post-treatment     Treatment Plan:   The patient and I discussed the risks and benefits of chiropractic care. Based on the patient's subjective complaints along with the examination findings, it is advised that a course of chiropractic treatment by initiated. The patient provided consent for care. The patient tolerated today's treatment with little or no additional discomfort and was instructed to contact the office for questions or concerns. Will see patient once per week then every 2 weeks when symptoms become mild/manageable, further spaced apart contingent upon improvement.     This chart note was generated using dictation software, and as such, there may be typographical errors present. Abbreviations: Cervical spine (CS), cervical-thoracic (CT), Dry needling (DN), Flexion adduction internal rotation (FAIR), high velocity, low amplitude (HVLA), Lumbar spine (LS), Soft tissue manipulation (STM), spinal manipulative therapy (SMT), Straight leg raise (SLR), Thoracic spine (TS).

## 2024-08-13 ENCOUNTER — APPOINTMENT (OUTPATIENT)
Dept: INTEGRATIVE MEDICINE | Facility: CLINIC | Age: 44
End: 2024-08-13
Payer: COMMERCIAL

## 2024-08-13 DIAGNOSIS — M54.50 CHRONIC BILATERAL LOW BACK PAIN WITHOUT SCIATICA: ICD-10-CM

## 2024-08-13 DIAGNOSIS — M99.03 SOMATIC DYSFUNCTION OF LUMBAR REGION: Primary | ICD-10-CM

## 2024-08-13 DIAGNOSIS — M99.01 CERVICAL SEGMENT DYSFUNCTION: ICD-10-CM

## 2024-08-13 DIAGNOSIS — M79.10 MYALGIA: ICD-10-CM

## 2024-08-13 DIAGNOSIS — G89.29 CHRONIC BILATERAL LOW BACK PAIN WITHOUT SCIATICA: ICD-10-CM

## 2024-08-13 DIAGNOSIS — M99.05 SOMATIC DYSFUNCTION OF PELVIS REGION: ICD-10-CM

## 2024-08-13 DIAGNOSIS — M54.12 CERVICAL RADICULOPATHY: ICD-10-CM

## 2024-08-13 PROCEDURE — 98941 CHIROPRACT MANJ 3-4 REGIONS: CPT | Performed by: CHIROPRACTOR

## 2024-08-13 PROCEDURE — 97112 NEUROMUSCULAR REEDUCATION: CPT | Performed by: CHIROPRACTOR

## 2024-08-20 ASSESSMENT — ENCOUNTER SYMPTOMS
DYSURIA: 0
AGITATION: 0
VOMITING: 0
DIZZINESS: 0
COLOR CHANGE: 0
DIFFICULTY URINATING: 0
NAUSEA: 0
DIARRHEA: 0
SHORTNESS OF BREATH: 0
FEVER: 0

## 2024-08-20 NOTE — PROGRESS NOTES
Assessment/Plan   Patient's neck and left periscapular pain is consistent with possible mild cervical radiculopathy considering he has slight diminished triceps reflex however his strength is preserved and there are no other neurologic deficits.  Treatment will involve soft tissue and spinal manipulation which he has responded well to in the past.  CS radiograph January 2024 showed mild degenerative changes at C5/6 which appears to correlate with symptoms. Recommended ongoing card and we can obtain C/S MRI if needed if symptoms don't continue to improve or he develops any weakness or more significant neurological deficits.     Visits this year: 18    Subjective   Patient reports that he felt much better after last visit and is still doing better currently with only mild occasional pain and tightness in the lower back and mild stiffness in the mid back.  He also endorses some tightness and soreness in the legs.  He was walking around at a concert event a lot last weekend and attributes the leg symptoms to that.  Did not have any major flares of back or neck pain recently    Left wrist pain 5/21/24 -patient endorses 1 month history of left wrist pain which was initially in the middle of the wrist but now is more in the lateral aspect of the wrist.  Has been aggravated by working using his hands.  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  Denies any specific trauma or injury. Pain typically mild. Has improved since our previous visit when he first noted it.    HPI - Neck pain 12/26/2023 -patient endorses 1 month insidious onset of neck pain left cervical thoracic pain and periscapular pain with occasional tingling in the left upper extremity and hand.  Symptoms came on without specific injury although he endorses a potentially unrelated issue of the left distal biceps/medial elbow.  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  Notes that his symptoms are exacerbated when extending his neck looking up.  Previously has benefited from chiropractic spinal manipulation but has not had this treatment recently. Prior history of low back pain and herniated disc and sciatic pain but this is currently not bothering him    LBP 4/23/2024 - Endorses chronic occasional LBP. Previously had more severe symptoms years prior which resolved with chiropractic care.     Review of Systems   Constitutional:  Negative for fever.   Eyes:  Negative for visual disturbance.   Respiratory:  Negative for shortness of breath.    Cardiovascular:  Negative for chest pain.   Gastrointestinal:  Negative for diarrhea, nausea and vomiting.   Genitourinary:  Negative for difficulty urinating and dysuria.   Skin:  Negative for color change.   Neurological:  Negative for dizziness.   Psychiatric/Behavioral:  Negative for agitation.    All other systems reviewed and are negative.    Objective   Examination findings (e.g., palpation & ROM): Decreased C/S and L/S ROM with pain, hypertonic and tender cervical erectors, and R LS erectors and R QL  SLR BL 65  -ve FAIR BL    Segmental joint dysfunction was identified in the following areas using motion palpation and/or pain provocation assessment:  Cervical: 7  Thoracic: 4-7  Lumbopelvic: 1, BL SIJ      Physical Exam  Neurological:      Mental Status: He is alert.      Sensory: Sensation is intact.      Motor: Motor function is intact.      Coordination: Coordination is intact.      Gait: Gait is intact.      Deep Tendon Reflexes:      Reflex Scores:       Tricep reflexes are 2+ on the right side and 2+ on the left side.       Bicep reflexes are 2+ on the right side and 2+ on the left side.       Brachioradialis reflexes are 2+ on the right side and 2+ on the left side.       Patellar reflexes are 2+ on the  right side and 2+ on the left side.       Achilles reflexes are 2+ on the right side and 2+ on the left side.     Comments: 4/23/24       Plan   Today's treatment:  SMT to regions of segmental dysfunction identified on exam, using age-appropriate force, and manual diversified technique.   Manual dynamic stretching of the gluteal muscles, hamstrings, upper trapezius  STM to patient tolerance to hypertonic paraspinal muscles   3:01 to 3:16 PM  Patient noted reduced pain and improved mobility post-treatment     Treatment Plan:   The patient and I discussed the risks and benefits of chiropractic care. Based on the patient's subjective complaints along with the examination findings, it is advised that a course of chiropractic treatment by initiated. The patient provided consent for care. The patient tolerated today's treatment with little or no additional discomfort and was instructed to contact the office for questions or concerns. Will see patient once per week then every 2 weeks when symptoms become mild/manageable, further spaced apart contingent upon improvement.     This chart note was generated using dictation software, and as such, there may be typographical errors present. Abbreviations: Cervical spine (CS), cervical-thoracic (CT), Dry needling (DN), Flexion adduction internal rotation (FAIR), high velocity, low amplitude (HVLA), Lumbar spine (LS), Soft tissue manipulation (STM), spinal manipulative therapy (SMT), Straight leg raise (SLR), Thoracic spine (TS).

## 2024-08-21 ENCOUNTER — APPOINTMENT (OUTPATIENT)
Dept: INTEGRATIVE MEDICINE | Facility: CLINIC | Age: 44
End: 2024-08-21
Payer: COMMERCIAL

## 2024-08-21 DIAGNOSIS — M54.50 CHRONIC BILATERAL LOW BACK PAIN WITHOUT SCIATICA: ICD-10-CM

## 2024-08-21 DIAGNOSIS — M99.03 SOMATIC DYSFUNCTION OF LUMBAR REGION: Primary | ICD-10-CM

## 2024-08-21 DIAGNOSIS — M99.01 CERVICAL SEGMENT DYSFUNCTION: ICD-10-CM

## 2024-08-21 DIAGNOSIS — M79.10 MYALGIA: ICD-10-CM

## 2024-08-21 DIAGNOSIS — M54.12 CERVICAL RADICULOPATHY: ICD-10-CM

## 2024-08-21 DIAGNOSIS — G89.29 CHRONIC BILATERAL LOW BACK PAIN WITHOUT SCIATICA: ICD-10-CM

## 2024-08-21 DIAGNOSIS — M99.05 SOMATIC DYSFUNCTION OF PELVIS REGION: ICD-10-CM

## 2024-08-21 PROCEDURE — 97112 NEUROMUSCULAR REEDUCATION: CPT | Performed by: CHIROPRACTOR

## 2024-08-21 PROCEDURE — 98941 CHIROPRACT MANJ 3-4 REGIONS: CPT | Performed by: CHIROPRACTOR

## 2024-08-23 NOTE — PROGRESS NOTES
Occupational Therapy Evaluation    Patient Name:  Aguila Jerry   Patient MRN: [id number]  Date: 8/26/2024  Time Calculation  Start Time: 0355  Stop Time: 0425  Time Calculation (min): 30 min    OT Evaluation Time Entry  OT Evaluation (Low) Time Entry: 10  OT Therapeutic Procedures Time Entry  Therapeutic Exercise Time Entry: 20                   ASSESSMENT:  Patient was referred to occupational therapy for an evaluation and treatment  for right deQuervain's tendonitis. OT evaluation completed this date.  Patient's main functional deficits include pain with twisting, lifting and carrying heavy objects, weight bearing and repetitive work tasks.  Patient would benefit from skilled OT in order to increase  strength and reduce pain levels.  Patient was issued written and illustrated handouts for /pinch strengthening and wrist strengthening along with instruction on wrist wrap for HEP to address these deficits. Patient verbalizes good understanding of HEP.    PLAN:       OT intervention plan includes: education/instruction, home program, manual therapy, therapeutic activities, therapeutic exercises, ultrasound, fluidotherapy, and splinting.  Frequency and duration: 1 time(s) a week, for 2-3 weeks.   Potential to achieve rehab goals is good.    Plan of care was developed with input and agreement by the patient.     Insurance:  Visit number: 1   Insurance Type: Payor: [facility] / Plan: [employer] / Product Type: *No Product type* /   Authorization or Plan of Care date Range: 60v/sheyla year (14 used)  Copay: NA  Referred by: Zulema Das PA-C     SUBJECTIVE:  Patient is a 44 year old who attends OT evaluation today after a cortisone injection on 7/29/24 from Dr. Green for right de Quervain's tendonitis.  He reports since the cortisone injection his pain has been significantly improved.  He has no pain at rest and his max pain levels have been 3/10 with weight bearing and heavy  lifting.  Orthopedic recommended OT to increase strength and improve pain free use of RUE.    he is RHD   his chief complaint is mild pain with weight bearing activities.  his goal for Occupational Therapy is to return to full strength and pain free movement in RUE     he lives with his family and works full time at [employer].  He is currently working full duty.     General:  Reason for visit: right de Quervain's tendonitis  Referred by: Dr. Green    Type of surgery: No surgery found  Date of surgery: No surgery found  Days since surgery: No surgery found        Current Problem:         Problem List Items Addressed This Visit             ICD-10-CM    Wrist pain, acute, right - Primary M25.531    Tenosynovitis, de Quervain M65.4     Other Visit Diagnoses         Codes    De Quervain's disease (tenosynovitis)     M65.4    Relevant Orders    Follow Up In Occupational Therapy            Medical Screening:       Reviewed medical history form with patient and medical screening assessed.        Medical History Form scanned into chart    Precautions:         Fall Risk: None         Denies: Cancer and Pacemaker        Past Surgical history: NA        Past Medical history: unremarkable        Pain Assessment:      Pain Assessment: 0-10      Pain (0-10): 3 max      Pain Location right hand    OBJECTIVE:  Active range of motion:  L Wrist:  - Flexion: 45  - Extension: 62  - Rd dev: 10  - Ul dev: 30    L Thumb:  -thumb flex/add 1.0  - radial abd 90     strength:  - RUE : 99#  - LUE : 87#    Pinch Strength:  - Lateral:       - RUE: 23#       - LUE: 21#  - 3 Jaw:       - RUE: 22#       - LUE: 22#      Outcome Measures:  OT Adult Other Outcome Measures  Other Outcome Measures: Quick Dash 15  (9.09%)      Goals:  Patient will present with a pain score of 1/10 in left wrist/hand.    Patient to increase left wrist ext AROM by 10 degrees in order to improve independent performance in daily activities.     Patient  "will improve left  strength by 8lb to improve performance in lifting and grasping tasks.     Patient to improve QuickDASH score to at or below 20% to increase independency in ADLs and IADLs.      Patient will report understanding of home program, demonstrate independence and verbalize precautions.     Patient will report follow through with wearing of orthosis as instructed by therapist.       Treatment Performed: (\"NP\" = Not Performed)     Treatment:  Low Complex OT Eval 10 min    Therapeutic Exercise: 20 min  - HEP Update:  3 way wrist PREs 3#                         : blue putty , roll and tip pinch, lat pinch, lumbrical                            pinch  - Education on anatomy of thumb and deQuervain's tendonitis  - Fit and Trained on left wrist wrap for work duties    Therapeutic Activities: NP  -   Manual Therapy: NP  -   Neuromuscular Re-Education: NP  -   Modalities: NP  -     Education: Home exercise program instructed and issued. and Educated regarding proper donning/doffing and wearing schedule of orthotic.  "

## 2024-08-26 ENCOUNTER — EVALUATION (OUTPATIENT)
Dept: OCCUPATIONAL THERAPY | Facility: CLINIC | Age: 44
End: 2024-08-26
Payer: COMMERCIAL

## 2024-08-26 DIAGNOSIS — M65.4 DE QUERVAIN'S DISEASE (TENOSYNOVITIS): ICD-10-CM

## 2024-08-26 DIAGNOSIS — M65.4 TENOSYNOVITIS, DE QUERVAIN: ICD-10-CM

## 2024-08-26 DIAGNOSIS — M25.531 WRIST PAIN, ACUTE, RIGHT: Primary | ICD-10-CM

## 2024-08-26 PROCEDURE — 97110 THERAPEUTIC EXERCISES: CPT | Mod: GO | Performed by: OCCUPATIONAL THERAPIST

## 2024-08-26 PROCEDURE — 97165 OT EVAL LOW COMPLEX 30 MIN: CPT | Mod: GO | Performed by: OCCUPATIONAL THERAPIST

## 2024-08-27 PROBLEM — M25.531 WRIST PAIN, ACUTE, RIGHT: Status: ACTIVE | Noted: 2024-08-27

## 2024-08-27 PROBLEM — M65.4 TENOSYNOVITIS, DE QUERVAIN: Status: ACTIVE | Noted: 2024-08-27

## 2024-08-27 ASSESSMENT — PATIENT HEALTH QUESTIONNAIRE - PHQ9
SUM OF ALL RESPONSES TO PHQ9 QUESTIONS 1 AND 2: 0
2. FEELING DOWN, DEPRESSED OR HOPELESS: NOT AT ALL
1. LITTLE INTEREST OR PLEASURE IN DOING THINGS: NOT AT ALL

## 2024-08-27 ASSESSMENT — PAIN - FUNCTIONAL ASSESSMENT: PAIN_FUNCTIONAL_ASSESSMENT: 0-10

## 2024-08-27 ASSESSMENT — ENCOUNTER SYMPTOMS
DEPRESSION: 0
LOSS OF SENSATION IN FEET: 0
OCCASIONAL FEELINGS OF UNSTEADINESS: 0

## 2024-08-27 ASSESSMENT — PAIN SCALES - GENERAL: PAINLEVEL_OUTOF10: 3

## 2024-09-05 ENCOUNTER — APPOINTMENT (OUTPATIENT)
Dept: INTEGRATIVE MEDICINE | Facility: CLINIC | Age: 44
End: 2024-09-05
Payer: COMMERCIAL

## 2024-09-11 ENCOUNTER — TREATMENT (OUTPATIENT)
Dept: OCCUPATIONAL THERAPY | Facility: CLINIC | Age: 44
End: 2024-09-11
Payer: COMMERCIAL

## 2024-09-11 DIAGNOSIS — M65.4 DE QUERVAIN'S DISEASE (TENOSYNOVITIS): ICD-10-CM

## 2024-09-11 PROCEDURE — 97110 THERAPEUTIC EXERCISES: CPT | Mod: GO | Performed by: OCCUPATIONAL THERAPIST

## 2024-09-11 NOTE — PROGRESS NOTES
OCCUPATIONAL THERAPY TREATMENT NOTE    Patient Name:  Aguila Jerry   Patient MRN: [id number]  Date: 9/11/2024       Insurance:  Visit number: 2   Insurance Type: Payor: [employer] [place] / Plan: [employer] / Product Type: *No Product type* /   Authorization or Plan of Care date Range: 60v/sheyla year (14 used)   Copay: NA  Referred by: Anthony Green DO                              General:  Reason for visit: right de Quervain's tendonitis  Referred by: Dr. Green    Type of surgery: No surgery found  Date of surgery: No surgery found  Days since surgery: No surgery found    Current Problem:         Problem List Items Addressed This Visit    None  Visit Diagnoses         Codes    De Quervain's disease (tenosynovitis)     M65.4            Assessment:  Progress towards functional goals: Improved mobility in left hand, Improve  and pinch strength, and Reduce pain level  Response to interventions:  Patient has achieved all established goals and is WNL with AROM and /pinch strength.  He will continue with strengthening HEP and be discharged from skilled OT at this time.  Justification for continued skilled care: discharge from skilled OT with continued strengthening HEP    Plan:  Discharge from occupational therapy    Subjective:  Aguila reports he feels like his condition is improving.  Aguila has been compliant with HEP and has been pain free since last OT visit at work and with all home management activities.  Progress towards functional goal:  Improve  and pinch strength, Improved performance in daily activities , and Reduce pain level  Pain Location right wrist  Pain (0-10): 0   HEP adherence / understanding: compliance with the instructed home exercises.    Precautions:  Fall Risk: None    Precautions listed: none    Therapy diagnoses:   1. De Quervain's disease (tenosynovitis)  Follow Up In Occupational Therapy        "    Objective:  Measured 8/26/24, 9/11/24  Active range of motion:  L Wrist:  - Flexion: 55  - Extension: 75  - Rd dev: 10  - Ul dev: 35     L Thumb:  -thumb flex/add 1.0 cm  - radial abd 90      strength:  - RUE : 99#  - LUE : 95#     Pinch Strength:  - Lateral:       - RUE: 23#       - LUE: 23#  - 3 Jaw:       - RUE: 22#       - LUE: 22#      Outcome Measures:  OT Adult Other Outcome Measures  Other Outcome Measures: Quick Dash 15  (9.09%)    Treatment Performed: (\"NP\" = Not Performed)     Therapeutic Exercise: 15 min  - objective measures of AROM, strength and functional outcomes  Therapeutic Activities: NP  -   Manual Therapy: NP  -   Neuromuscular Re-Education: NP  -   Modalities: NP  -     Education: Reviewed home exercise program.    Goals:  Patient will present with a pain score of 1/10 in left wrist/hand.   GOAL ACHIEVED 9/11/24  Patient to increase left wrist ext AROM by 10 degrees in order to improve independent performance in daily activities.     GOAL ACHIEVED 9/11/24  Patient will improve left  strength by 8lb to improve performance in lifting and grasping tasks.     GOAL ACHIEVED 9/11/24  Patient to improve QuickDASH score to at or below 20% to increase independency in ADLs and IADLs.     GOAL ACHIEVED 9/11/24  Patient will report understanding of home program, demonstrate independence and verbalize precautions.    GOAL ACHIEVED 9/11/24    "

## 2024-09-13 ASSESSMENT — ENCOUNTER SYMPTOMS
SHORTNESS OF BREATH: 0
FEVER: 0
AGITATION: 0
DIZZINESS: 0
DYSURIA: 0
VOMITING: 0
DIARRHEA: 0
NAUSEA: 0
DIFFICULTY URINATING: 0
COLOR CHANGE: 0

## 2024-09-13 NOTE — PROGRESS NOTES
Assessment/Plan   Patient's neck and left periscapular pain is consistent with possible mild cervical radiculopathy considering he has slight diminished triceps reflex however his strength is preserved and there are no other neurologic deficits.  Treatment will involve soft tissue and spinal manipulation which he has responded well to in the past.  CS radiograph January 2024 showed mild degenerative changes at C5/6 which appears to correlate with symptoms. Recommended ongoing card and we can obtain C/S MRI if needed if symptoms don't continue to improve or he develops any weakness or more significant neurological deficits.     Visits this year: 19    Subjective   Patient reports feeling right-sided lower back pain that radiates to the glute. He rates this pain a 5/10 NRS. Denies neck pain. Denies recent injury. Felt better after last visit. Symptoms gradually returned over past week.    Left wrist pain 5/21/24 -patient endorses 1 month history of left wrist pain which was initially in the middle of the wrist but now is more in the lateral aspect of the wrist.  Has been aggravated by working using his hands.  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  Denies any specific trauma or injury. Pain typically mild. Has improved since our previous visit when he first noted it.    HPI - Neck pain 12/26/2023 -patient endorses 1 month insidious onset of neck pain left cervical thoracic pain and periscapular pain with occasional tingling in the left upper extremity and hand.  Symptoms came on without specific injury although he endorses a potentially unrelated issue of the left distal biceps/medial elbow.  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  Notes that his symptoms are exacerbated when extending his neck  looking up.  Previously has benefited from chiropractic spinal manipulation but has not had this treatment recently. Prior history of low back pain and herniated disc and sciatic pain but this is currently not bothering him    LBP 4/23/2024 - Endorses chronic occasional LBP. Previously had more severe symptoms years prior which resolved with chiropractic care.     Review of Systems   Constitutional:  Negative for fever.   Eyes:  Negative for visual disturbance.   Respiratory:  Negative for shortness of breath.    Cardiovascular:  Negative for chest pain.   Gastrointestinal:  Negative for diarrhea, nausea and vomiting.   Genitourinary:  Negative for difficulty urinating and dysuria.   Skin:  Negative for color change.   Neurological:  Negative for dizziness.   Psychiatric/Behavioral:  Negative for agitation.    All other systems reviewed and are negative.    Objective   Examination findings (e.g., palpation & ROM): Decreased C/S and L/S ROM with pain, hypertonic and tender cervical erectors, and R LS erectors and R QL  SLR BL 65  -ve FAIR BL    Segmental joint dysfunction was identified in the following areas using motion palpation and/or pain provocation assessment:  Cervical: 7  Thoracic: 4-7  Lumbopelvic: 1, BL SIJ      Physical Exam  Neurological:      Mental Status: He is alert.      Sensory: Sensation is intact.      Motor: Motor function is intact.      Coordination: Coordination is intact.      Gait: Gait is intact.      Deep Tendon Reflexes:      Reflex Scores:       Tricep reflexes are 2+ on the right side and 2+ on the left side.       Bicep reflexes are 2+ on the right side and 2+ on the left side.       Brachioradialis reflexes are 2+ on the right side and 2+ on the left side.       Patellar reflexes are 2+ on the right side and 2+ on the left side.       Achilles reflexes are 2+ on the right side and 2+ on the left side.     Comments: 4/23/24       Plan   Today's treatment:  SMT to regions of segmental  dysfunction identified on exam, using age-appropriate force, and manual diversified technique.   Manual dynamic stretching of the gluteal muscles, hamstrings, upper trapezius  STM to patient tolerance to hypertonic paraspinal muscles   2:42 to 2:59 PM  Patient noted reduced pain and improved mobility post-treatment     Treatment Plan:   The patient and I discussed the risks and benefits of chiropractic care. Based on the patient's subjective complaints along with the examination findings, it is advised that a course of chiropractic treatment by initiated. The patient provided consent for care. The patient tolerated today's treatment with little or no additional discomfort and was instructed to contact the office for questions or concerns. Will see patient once per week then every 2 weeks when symptoms become mild/manageable, further spaced apart contingent upon improvement.     This chart note was generated using dictation software, and as such, there may be typographical errors present. Abbreviations: Cervical spine (CS), cervical-thoracic (CT), Dry needling (DN), Flexion adduction internal rotation (FAIR), high velocity, low amplitude (HVLA), Lumbar spine (LS), Soft tissue manipulation (STM), spinal manipulative therapy (SMT), Straight leg raise (SLR), Thoracic spine (TS).

## 2024-09-16 ENCOUNTER — OFFICE VISIT (OUTPATIENT)
Dept: ORTHOPEDIC SURGERY | Facility: CLINIC | Age: 44
End: 2024-09-16
Payer: COMMERCIAL

## 2024-09-16 DIAGNOSIS — M65.4 DE QUERVAIN'S DISEASE (TENOSYNOVITIS): Primary | ICD-10-CM

## 2024-09-16 PROCEDURE — 99213 OFFICE O/P EST LOW 20 MIN: CPT | Performed by: ORTHOPAEDIC SURGERY

## 2024-09-16 NOTE — PROGRESS NOTES
History present illness: Patient presents today for evaluation of left Decore veins disease.  The patient has done well with steroid injection and therapy.        Physical examination:  General: Alert and oriented to person, place, and time.  No acute distress and breathing comfortably: Pleasant and cooperative with examination.  Extremities: Evaluation of the left upper extremity finds the patient had palpable radial artery at the wrist with brisk capillary refill to all digits.  Patient has intact sensation to axillary radial median and ulnar nerves.  There are no open wounds.  There are no signs of infection.  There is no evidence of lymphedema or lymphatic streaking.  The patient has supple compartments to left arm forearm and hand.  No tenderness over first dorsal compartment.      Diagnostic studies:      Assessment: Left wrist first dorsal compartment tenosynovitis responding well to nonoperative measures      Plan: Treatment options were discussed.  Recommendations were made for continuance of home exercise program for an additional 4 weeks and follow-up with me if and when symptoms return.      Procedure:        Procedure:

## 2024-09-17 ENCOUNTER — APPOINTMENT (OUTPATIENT)
Dept: INTEGRATIVE MEDICINE | Facility: CLINIC | Age: 44
End: 2024-09-17
Payer: COMMERCIAL

## 2024-09-17 DIAGNOSIS — M54.12 CERVICAL RADICULOPATHY: ICD-10-CM

## 2024-09-17 DIAGNOSIS — M99.02 SOMATIC DYSFUNCTION OF THORACIC REGION: ICD-10-CM

## 2024-09-17 DIAGNOSIS — M54.50 CHRONIC BILATERAL LOW BACK PAIN WITHOUT SCIATICA: ICD-10-CM

## 2024-09-17 DIAGNOSIS — M99.03 SOMATIC DYSFUNCTION OF LUMBAR REGION: Primary | ICD-10-CM

## 2024-09-17 DIAGNOSIS — M79.10 MYALGIA: ICD-10-CM

## 2024-09-17 DIAGNOSIS — G89.29 CHRONIC BILATERAL LOW BACK PAIN WITHOUT SCIATICA: ICD-10-CM

## 2024-09-17 DIAGNOSIS — M99.05 SOMATIC DYSFUNCTION OF PELVIS REGION: ICD-10-CM

## 2024-09-17 DIAGNOSIS — M99.01 CERVICAL SEGMENT DYSFUNCTION: ICD-10-CM

## 2024-09-17 PROCEDURE — 98941 CHIROPRACT MANJ 3-4 REGIONS: CPT | Performed by: CHIROPRACTOR

## 2024-09-17 PROCEDURE — 97140 MANUAL THERAPY 1/> REGIONS: CPT | Performed by: CHIROPRACTOR

## 2024-10-03 ASSESSMENT — ENCOUNTER SYMPTOMS
NAUSEA: 0
AGITATION: 0
DYSURIA: 0
COLOR CHANGE: 0
DIZZINESS: 0
DIFFICULTY URINATING: 0
DIARRHEA: 0
VOMITING: 0
FEVER: 0
SHORTNESS OF BREATH: 0

## 2024-10-03 NOTE — PROGRESS NOTES
Assessment/Plan   Patient's neck and left periscapular pain is consistent with possible mild cervical radiculopathy considering he has slight diminished triceps reflex however his strength is preserved and there are no other neurologic deficits.  Treatment will involve soft tissue and spinal manipulation which he has responded well to in the past.  CS radiograph January 2024 showed mild degenerative changes at C5/6 which appears to correlate with symptoms. Recommended ongoing card and we can obtain C/S MRI if needed if symptoms don't continue to improve or he develops any weakness or more significant neurological deficits.     Visits this year: 20    Subjective   Patient reports feeling mild stiffness/soreness in the neck and shoulders. He just returned from [place] and notes that he felt minimal pain during his trip. L wrist not bothering him. No current LBP.    Left wrist pain 5/21/24 -patient endorses 1 month history of left wrist pain which was initially in the middle of the wrist but now is more in the lateral aspect of the wrist.  Has been aggravated by working using his hands.  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  Denies any specific trauma or injury. Pain typically mild. Has improved since our previous visit when he first noted it.    HPI - Neck pain 12/26/2023 -patient endorses 1 month insidious onset of neck pain left cervical thoracic pain and periscapular pain with occasional tingling in the left upper extremity and hand.  Symptoms came on without specific injury although he endorses a potentially unrelated issue of the left distal biceps/medial elbow.  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  Notes that his symptoms are exacerbated when extending his neck looking up.  Previously has  benefited from chiropractic spinal manipulation but has not had this treatment recently. Prior history of low back pain and herniated disc and sciatic pain but this is currently not bothering him    LBP 4/23/2024 - Endorses chronic occasional LBP. Previously had more severe symptoms years prior which resolved with chiropractic care.     Review of Systems   Constitutional:  Negative for fever.   Eyes:  Negative for visual disturbance.   Respiratory:  Negative for shortness of breath.    Cardiovascular:  Negative for chest pain.   Gastrointestinal:  Negative for diarrhea, nausea and vomiting.   Genitourinary:  Negative for difficulty urinating and dysuria.   Skin:  Negative for color change.   Neurological:  Negative for dizziness.   Psychiatric/Behavioral:  Negative for agitation.    All other systems reviewed and are negative.    Objective   Examination findings (e.g., palpation & ROM): Decreased C/S and L/S ROM with pain, hypertonic and tender cervical erectors, and R LS erectors and R QL  SLR BL 65  -ve FAIR BL    Segmental joint dysfunction was identified in the following areas using motion palpation and/or pain provocation assessment:  Cervical: 7  Thoracic: 4-7  Lumbopelvic: 1, BL SIJ      Physical Exam  Neurological:      Mental Status: He is alert.      Sensory: Sensation is intact.      Motor: Motor function is intact.      Coordination: Coordination is intact.      Gait: Gait is intact.      Deep Tendon Reflexes:      Reflex Scores:       Tricep reflexes are 2+ on the right side and 2+ on the left side.       Bicep reflexes are 2+ on the right side and 2+ on the left side.       Brachioradialis reflexes are 2+ on the right side and 2+ on the left side.       Patellar reflexes are 2+ on the right side and 2+ on the left side.       Achilles reflexes are 2+ on the right side and 2+ on the left side.     Comments: 4/23/24       Plan   Today's treatment:  SMT to regions of segmental dysfunction identified on  exam, using age-appropriate force, and manual diversified technique.   Manual dynamic stretching of the gluteal muscles, hamstrings, upper trapezius  STM to patient tolerance to hypertonic paraspinal muscles   2:41 to 2:59 PM  Patient noted reduced pain and improved mobility post-treatment     Treatment Plan:   The patient and I discussed the risks and benefits of chiropractic care. Based on the patient's subjective complaints along with the examination findings, it is advised that a course of chiropractic treatment by initiated. The patient provided consent for care. The patient tolerated today's treatment with little or no additional discomfort and was instructed to contact the office for questions or concerns. Will see patient once per week then every 2 weeks when symptoms become mild/manageable, further spaced apart contingent upon improvement.     This chart note was generated using dictation software, and as such, there may be typographical errors present. Abbreviations: Cervical spine (CS), cervical-thoracic (CT), Dry needling (DN), Flexion adduction internal rotation (FAIR), high velocity, low amplitude (HVLA), Lumbar spine (LS), Soft tissue manipulation (STM), spinal manipulative therapy (SMT), Straight leg raise (SLR), Thoracic spine (TS).

## 2024-10-08 ENCOUNTER — APPOINTMENT (OUTPATIENT)
Dept: INTEGRATIVE MEDICINE | Facility: CLINIC | Age: 44
End: 2024-10-08
Payer: COMMERCIAL

## 2024-10-08 DIAGNOSIS — M79.10 MYALGIA: ICD-10-CM

## 2024-10-08 DIAGNOSIS — M54.12 CERVICAL RADICULOPATHY: ICD-10-CM

## 2024-10-08 DIAGNOSIS — M54.50 CHRONIC BILATERAL LOW BACK PAIN WITHOUT SCIATICA: ICD-10-CM

## 2024-10-08 DIAGNOSIS — M99.01 CERVICAL SEGMENT DYSFUNCTION: ICD-10-CM

## 2024-10-08 DIAGNOSIS — G89.29 CHRONIC BILATERAL LOW BACK PAIN WITHOUT SCIATICA: ICD-10-CM

## 2024-10-08 DIAGNOSIS — M99.05 SOMATIC DYSFUNCTION OF PELVIS REGION: ICD-10-CM

## 2024-10-08 DIAGNOSIS — M99.03 SOMATIC DYSFUNCTION OF LUMBAR REGION: Primary | ICD-10-CM

## 2024-10-08 DIAGNOSIS — M99.02 SOMATIC DYSFUNCTION OF THORACIC REGION: ICD-10-CM

## 2024-10-08 PROCEDURE — 98941 CHIROPRACT MANJ 3-4 REGIONS: CPT | Performed by: CHIROPRACTOR

## 2024-10-08 PROCEDURE — 97112 NEUROMUSCULAR REEDUCATION: CPT | Performed by: CHIROPRACTOR

## 2024-10-18 ASSESSMENT — ENCOUNTER SYMPTOMS
NAUSEA: 0
DYSURIA: 0
FEVER: 0
COLOR CHANGE: 0
AGITATION: 0
DIFFICULTY URINATING: 0
VOMITING: 0
DIARRHEA: 0
DIZZINESS: 0
SHORTNESS OF BREATH: 0

## 2024-10-18 NOTE — PROGRESS NOTES
Assessment/Plan   Patient's neck and left periscapular pain is consistent with possible mild cervical radiculopathy considering he has slight diminished triceps reflex however his strength is preserved and there are no other neurologic deficits.  Treatment will involve soft tissue and spinal manipulation which he has responded well to in the past.  CS radiograph January 2024 showed mild degenerative changes at C5/6 which appears to correlate with symptoms. Recommended ongoing card and we can obtain C/S MRI if needed if symptoms don't continue to improve or he develops any weakness or more significant neurological deficits.     Visits this year: 21    Subjective   Patient reports feeling mild stiffness/soreness in the neck and shoulders today. Denies radiation, numbness or tingling. Denies LBP. Has been doing a lot of heavy lifting and overuse but denies specific injury.    Left wrist pain 5/21/24 -patient endorses 1 month history of left wrist pain which was initially in the middle of the wrist but now is more in the lateral aspect of the wrist.  Has been aggravated by working using his hands.  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  Denies any specific trauma or injury. Pain typically mild. Has improved since our previous visit when he first noted it.    HPI - Neck pain 12/26/2023 -patient endorses 1 month insidious onset of neck pain left cervical thoracic pain and periscapular pain with occasional tingling in the left upper extremity and hand.  Symptoms came on without specific injury although he endorses a potentially unrelated issue of the left distal biceps/medial elbow.  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  Notes that his symptoms are exacerbated when extending his neck looking up.   Previously has benefited from chiropractic spinal manipulation but has not had this treatment recently. Prior history of low back pain and herniated disc and sciatic pain but this is currently not bothering him    LBP 4/23/2024 - Endorses chronic occasional LBP. Previously had more severe symptoms years prior which resolved with chiropractic care.     Review of Systems   Constitutional:  Negative for fever.   Eyes:  Negative for visual disturbance.   Respiratory:  Negative for shortness of breath.    Cardiovascular:  Negative for chest pain.   Gastrointestinal:  Negative for diarrhea, nausea and vomiting.   Genitourinary:  Negative for difficulty urinating and dysuria.   Skin:  Negative for color change.   Neurological:  Negative for dizziness.   Psychiatric/Behavioral:  Negative for agitation.    All other systems reviewed and are negative.    Objective   Examination findings (e.g., palpation & ROM): Decreased C/S and L/S ROM with pain, hypertonic and tender cervical erectors, and R LS erectors and R QL  SLR BL 65  -ve FAIR BL    Segmental joint dysfunction was identified in the following areas using motion palpation and/or pain provocation assessment:  Cervical: 7  Thoracic: 4-6  Lumbopelvic: 1, BL SIJ      Physical Exam  Neurological:      Mental Status: He is alert.      Sensory: Sensation is intact.      Motor: Motor function is intact.      Coordination: Coordination is intact.      Gait: Gait is intact.      Deep Tendon Reflexes:      Reflex Scores:       Tricep reflexes are 2+ on the right side and 2+ on the left side.       Bicep reflexes are 2+ on the right side and 2+ on the left side.       Brachioradialis reflexes are 2+ on the right side and 2+ on the left side.       Patellar reflexes are 2+ on the right side and 2+ on the left side.       Achilles reflexes are 2+ on the right side and 2+ on the left side.     Comments: 4/23/24       Plan   Today's treatment:  SMT to regions of segmental dysfunction  identified on exam, using age-appropriate force, and manual diversified technique.   Manual dynamic stretching of the gluteal muscles, hamstrings, upper trapezius  STM to patient tolerance to hypertonic paraspinal muscles   2:41 to 2:59 PM  Patient noted reduced pain and improved mobility post-treatment     Treatment Plan:   The patient and I discussed the risks and benefits of chiropractic care. Based on the patient's subjective complaints along with the examination findings, it is advised that a course of chiropractic treatment by initiated. The patient provided consent for care. The patient tolerated today's treatment with little or no additional discomfort and was instructed to contact the office for questions or concerns. Will see patient once per week then every 2 weeks when symptoms become mild/manageable, further spaced apart contingent upon improvement.     This chart note was generated using dictation software, and as such, there may be typographical errors present. Abbreviations: Cervical spine (CS), cervical-thoracic (CT), Dry needling (DN), Flexion adduction internal rotation (FAIR), high velocity, low amplitude (HVLA), Lumbar spine (LS), Soft tissue manipulation (STM), spinal manipulative therapy (SMT), Straight leg raise (SLR), Thoracic spine (TS).

## 2024-10-22 ENCOUNTER — APPOINTMENT (OUTPATIENT)
Dept: INTEGRATIVE MEDICINE | Facility: CLINIC | Age: 44
End: 2024-10-22
Payer: COMMERCIAL

## 2024-10-22 DIAGNOSIS — M99.05 SOMATIC DYSFUNCTION OF PELVIS REGION: ICD-10-CM

## 2024-10-22 DIAGNOSIS — G89.29 CHRONIC BILATERAL LOW BACK PAIN WITHOUT SCIATICA: ICD-10-CM

## 2024-10-22 DIAGNOSIS — M54.50 CHRONIC BILATERAL LOW BACK PAIN WITHOUT SCIATICA: ICD-10-CM

## 2024-10-22 DIAGNOSIS — M54.12 CERVICAL RADICULOPATHY: ICD-10-CM

## 2024-10-22 DIAGNOSIS — M79.10 MYALGIA: ICD-10-CM

## 2024-10-22 DIAGNOSIS — M99.01 CERVICAL SEGMENT DYSFUNCTION: ICD-10-CM

## 2024-10-22 DIAGNOSIS — M99.03 SOMATIC DYSFUNCTION OF LUMBAR REGION: Primary | ICD-10-CM

## 2024-10-22 DIAGNOSIS — M99.02 SOMATIC DYSFUNCTION OF THORACIC REGION: ICD-10-CM

## 2024-11-13 ASSESSMENT — ENCOUNTER SYMPTOMS
VOMITING: 0
DIZZINESS: 0
AGITATION: 0
SHORTNESS OF BREATH: 0
DYSURIA: 0
DIFFICULTY URINATING: 0
COLOR CHANGE: 0
FEVER: 0
DIARRHEA: 0
NAUSEA: 0

## 2024-11-13 NOTE — PROGRESS NOTES
Assessment/Plan   Patient's neck and left periscapular pain is consistent with possible mild cervical radiculopathy considering he has slight diminished triceps reflex however his strength is preserved and there are no other neurologic deficits.  Treatment will involve soft tissue and spinal manipulation which he has responded well to in the past.  CS radiograph January 2024 showed mild degenerative changes at C5/6 which appears to correlate with symptoms. Recommended ongoing card and we can obtain C/S MRI if needed if symptoms don't continue to improve or he develops any weakness or more significant neurological deficits.     Visits this year: 22    Subjective   Patient reports feeling mild stiffness/soreness in the neck, upper back, and shoulders. Endorses numbness in the right upper extremity from his elbow to his hand, which mostly occurs when he wakes up in the morning. Has been diagnosed with carpal tunnel in the past. Has worn splints at night sometimes.    Left wrist pain 5/21/24 -patient endorses 1 month history of left wrist pain which was initially in the middle of the wrist but now is more in the lateral aspect of the wrist.  Has been aggravated by working using his hands.  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  Denies any specific trauma or injury. Pain typically mild. Has improved since our previous visit when he first noted it.    HPI - Neck pain 12/26/2023 -patient endorses 1 month insidious onset of neck pain left cervical thoracic pain and periscapular pain with occasional tingling in the left upper extremity and hand.  Symptoms came on without specific injury although he endorses a potentially unrelated issue of the left distal biceps/medial elbow.  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  Notes that his symptoms are exacerbated when extending his neck looking up.  Previously has benefited from chiropractic spinal manipulation but has not had this treatment recently. Prior history of low back pain and herniated disc and sciatic pain but this is currently not bothering him    LBP 4/23/2024 - Endorses chronic occasional LBP. Previously had more severe symptoms years prior which resolved with chiropractic care.     Review of Systems   Constitutional:  Negative for fever.   Eyes:  Negative for visual disturbance.   Respiratory:  Negative for shortness of breath.    Cardiovascular:  Negative for chest pain.   Gastrointestinal:  Negative for diarrhea, nausea and vomiting.   Genitourinary:  Negative for difficulty urinating and dysuria.   Skin:  Negative for color change.   Neurological:  Negative for dizziness.   Psychiatric/Behavioral:  Negative for agitation.    All other systems reviewed and are negative.    Objective   Examination findings (e.g., palpation & ROM): Decreased C/S and L/S ROM with pain, hypertonic and tender cervical erectors, and R LS erectors and R QL  SLR BL 65  -ve FAIR BL    Segmental joint dysfunction was identified in the following areas using motion palpation and/or pain provocation assessment:  Cervical: 7  Thoracic: 4-7  Lumbopelvic: 1, BL SIJ      Physical Exam  Neurological:      Mental Status: He is alert.      Sensory: Sensation is intact.      Motor: Motor function is intact.      Coordination: Coordination is intact.      Gait: Gait is intact.      Deep Tendon Reflexes:      Reflex Scores:       Tricep reflexes are 2+ on the right side and 2+ on the left side.       Bicep reflexes are 2+ on the right side and 2+ on the left side.       Brachioradialis reflexes are 2+ on the right side and 2+ on the left side.       Patellar reflexes are 2+ on the right side and 2+ on the left side.       Achilles reflexes are 2+ on the right side and 2+ on the left side.      Comments: 4/23/24       Plan   Today's treatment:  SMT to regions of segmental dysfunction identified on exam, using age-appropriate force, and manual diversified technique.   Manual dynamic stretching of the gluteal muscles, hamstrings, upper trapezius  STM to patient tolerance to hypertonic paraspinal muscles   3:41 to 3:56 PM  Patient noted reduced pain and improved mobility post-treatment     Treatment Plan:   The patient and I discussed the risks and benefits of chiropractic care. Based on the patient's subjective complaints along with the examination findings, it is advised that a course of chiropractic treatment by initiated. The patient provided consent for care. The patient tolerated today's treatment with little or no additional discomfort and was instructed to contact the office for questions or concerns. Will see patient once per week then every 2 weeks when symptoms become mild/manageable, further spaced apart contingent upon improvement.     This chart note was generated using dictation software, and as such, there may be typographical errors present. Abbreviations: Cervical spine (CS), cervical-thoracic (CT), Dry needling (DN), Flexion adduction internal rotation (FAIR), high velocity, low amplitude (HVLA), Lumbar spine (LS), Soft tissue manipulation (STM), spinal manipulative therapy (SMT), Straight leg raise (SLR), Thoracic spine (TS).

## 2024-11-14 ENCOUNTER — APPOINTMENT (OUTPATIENT)
Dept: INTEGRATIVE MEDICINE | Facility: CLINIC | Age: 44
End: 2024-11-14
Payer: COMMERCIAL

## 2024-11-14 DIAGNOSIS — M54.50 CHRONIC BILATERAL LOW BACK PAIN WITHOUT SCIATICA: ICD-10-CM

## 2024-11-14 DIAGNOSIS — M99.01 CERVICAL SEGMENT DYSFUNCTION: ICD-10-CM

## 2024-11-14 DIAGNOSIS — M79.10 MYALGIA: ICD-10-CM

## 2024-11-14 DIAGNOSIS — G89.29 CHRONIC BILATERAL LOW BACK PAIN WITHOUT SCIATICA: ICD-10-CM

## 2024-11-14 DIAGNOSIS — M99.05 SOMATIC DYSFUNCTION OF PELVIS REGION: ICD-10-CM

## 2024-11-14 DIAGNOSIS — M99.03 SOMATIC DYSFUNCTION OF LUMBAR REGION: Primary | ICD-10-CM

## 2024-11-14 DIAGNOSIS — M99.02 SOMATIC DYSFUNCTION OF THORACIC REGION: ICD-10-CM

## 2024-11-14 DIAGNOSIS — M54.12 CERVICAL RADICULOPATHY: ICD-10-CM

## 2024-11-14 PROCEDURE — 97112 NEUROMUSCULAR REEDUCATION: CPT | Performed by: CHIROPRACTOR

## 2024-11-14 PROCEDURE — 98941 CHIROPRACT MANJ 3-4 REGIONS: CPT | Performed by: CHIROPRACTOR

## 2024-11-26 ENCOUNTER — APPOINTMENT (OUTPATIENT)
Dept: INTEGRATIVE MEDICINE | Facility: CLINIC | Age: 44
End: 2024-11-26
Payer: COMMERCIAL

## 2024-11-26 DIAGNOSIS — M99.01 CERVICAL SEGMENT DYSFUNCTION: ICD-10-CM

## 2024-11-26 DIAGNOSIS — M54.50 CHRONIC BILATERAL LOW BACK PAIN WITHOUT SCIATICA: ICD-10-CM

## 2024-11-26 DIAGNOSIS — M99.02 SOMATIC DYSFUNCTION OF THORACIC REGION: ICD-10-CM

## 2024-11-26 DIAGNOSIS — M99.03 SOMATIC DYSFUNCTION OF LUMBAR REGION: Primary | ICD-10-CM

## 2024-11-26 DIAGNOSIS — M99.05 SOMATIC DYSFUNCTION OF PELVIS REGION: ICD-10-CM

## 2024-11-26 DIAGNOSIS — M79.10 MYALGIA: ICD-10-CM

## 2024-11-26 DIAGNOSIS — G89.29 CHRONIC BILATERAL LOW BACK PAIN WITHOUT SCIATICA: ICD-10-CM

## 2024-11-26 DIAGNOSIS — M54.12 CERVICAL RADICULOPATHY: ICD-10-CM

## 2024-11-26 PROCEDURE — 97112 NEUROMUSCULAR REEDUCATION: CPT | Performed by: CHIROPRACTOR

## 2024-11-26 PROCEDURE — 98941 CHIROPRACT MANJ 3-4 REGIONS: CPT | Performed by: CHIROPRACTOR

## 2024-11-26 ASSESSMENT — ENCOUNTER SYMPTOMS
SHORTNESS OF BREATH: 0
DYSURIA: 0
NAUSEA: 0
DIFFICULTY URINATING: 0
AGITATION: 0
DIZZINESS: 0
DIARRHEA: 0
VOMITING: 0
COLOR CHANGE: 0
FEVER: 0

## 2024-11-26 NOTE — PROGRESS NOTES
Assessment/Plan   Patient's neck and left periscapular pain is consistent with possible mild cervical radiculopathy considering he has slight diminished triceps reflex however his strength is preserved and there are no other neurologic deficits.  Treatment will involve soft tissue and spinal manipulation which he has responded well to in the past.  CS radiograph January 2024 showed mild degenerative changes at C5/6 which appears to correlate with symptoms. Recommended ongoing card and we can obtain C/S MRI if needed if symptoms don't continue to improve or he develops any weakness or more significant neurological deficits.     Visits this year: 23    Subjective   Patient reports that he got relief after last visit with gradual return of symptoms. He endorses moderate intensity pain and stiffness in his lower back. Neck pain has been mild. He still endorses intermittent L elbow pain.     Left wrist pain 5/21/24 -patient endorses 1 month history of left wrist pain which was initially in the middle of the wrist but now is more in the lateral aspect of the wrist.  Has been aggravated by working using his hands.  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  Denies any specific trauma or injury. Pain typically mild. Has improved since our previous visit when he first noted it.    HPI - Neck pain 12/26/2023 -patient endorses 1 month insidious onset of neck pain left cervical thoracic pain and periscapular pain with occasional tingling in the left upper extremity and hand.  Symptoms came on without specific injury although he endorses a potentially unrelated issue of the left distal biceps/medial elbow.  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  Notes that his symptoms are exacerbated when extending his neck  looking up.  Previously has benefited from chiropractic spinal manipulation but has not had this treatment recently. Prior history of low back pain and herniated disc and sciatic pain but this is currently not bothering him    LBP 4/23/2024 - Endorses chronic occasional LBP. Previously had more severe symptoms years prior which resolved with chiropractic care.     Review of Systems   Constitutional:  Negative for fever.   Eyes:  Negative for visual disturbance.   Respiratory:  Negative for shortness of breath.    Cardiovascular:  Negative for chest pain.   Gastrointestinal:  Negative for diarrhea, nausea and vomiting.   Genitourinary:  Negative for difficulty urinating and dysuria.   Skin:  Negative for color change.   Neurological:  Negative for dizziness.   Psychiatric/Behavioral:  Negative for agitation.    All other systems reviewed and are negative.    Objective   Examination findings (e.g., palpation & ROM): Decreased C/S and L/S ROM with pain, hypertonic and tender cervical erectors, and R LS erectors and R QL  SLR BL 65  -ve FAIR BL    Segmental joint dysfunction was identified in the following areas using motion palpation and/or pain provocation assessment:  Cervical: 7  Thoracic: 4-7  Lumbopelvic: 1, BL SIJ    Physical Exam  Neurological:      Mental Status: He is alert.      Sensory: Sensation is intact.      Motor: Motor function is intact.      Coordination: Coordination is intact.      Gait: Gait is intact.      Deep Tendon Reflexes:      Reflex Scores:       Tricep reflexes are 2+ on the right side and 2+ on the left side.       Bicep reflexes are 2+ on the right side and 2+ on the left side.       Brachioradialis reflexes are 2+ on the right side and 2+ on the left side.       Patellar reflexes are 2+ on the right side and 2+ on the left side.       Achilles reflexes are 2+ on the right side and 2+ on the left side.     Comments: 4/23/24       Plan   Today's treatment:  SMT to regions of segmental  dysfunction identified on exam, using age-appropriate force, and manual diversified technique.   Manual dynamic stretching of the gluteal muscles, hamstrings, upper trapezius  STM to patient tolerance to hypertonic paraspinal muscles   Dry needling (10 in, 10 out) to region of the chief complaint / hypertonic muscles identified upon palpation LS erectors  2:41 to 2:56 PM  Patient noted reduced pain and improved mobility post-treatment     Treatment Plan:   The patient and I discussed the risks and benefits of chiropractic care. Based on the patient's subjective complaints along with the examination findings, it is advised that a course of chiropractic treatment by initiated. The patient provided consent for care. The patient tolerated today's treatment with little or no additional discomfort and was instructed to contact the office for questions or concerns. Will see patient once per week then every 2 weeks when symptoms become mild/manageable, further spaced apart contingent upon improvement.     This chart note was generated using dictation software, and as such, there may be typographical errors present. Abbreviations: Cervical spine (CS), cervical-thoracic (CT), Dry needling (DN), Flexion adduction internal rotation (FAIR), high velocity, low amplitude (HVLA), Lumbar spine (LS), Soft tissue manipulation (STM), spinal manipulative therapy (SMT), Straight leg raise (SLR), Thoracic spine (TS).

## 2024-12-10 ENCOUNTER — APPOINTMENT (OUTPATIENT)
Dept: INTEGRATIVE MEDICINE | Facility: CLINIC | Age: 44
End: 2024-12-10
Payer: COMMERCIAL

## 2025-01-02 ENCOUNTER — APPOINTMENT (OUTPATIENT)
Dept: INTEGRATIVE MEDICINE | Facility: CLINIC | Age: 45
End: 2025-01-02
Payer: COMMERCIAL

## 2025-01-13 ASSESSMENT — ENCOUNTER SYMPTOMS
VOMITING: 0
COLOR CHANGE: 0
NAUSEA: 0
FEVER: 0
DIARRHEA: 0
DIFFICULTY URINATING: 0
DIZZINESS: 0
DYSURIA: 0
SHORTNESS OF BREATH: 0
AGITATION: 0

## 2025-01-13 NOTE — PROGRESS NOTES
Assessment/Plan   Patient's neck and left periscapular pain is consistent with possible mild cervical radiculopathy considering he has slight diminished triceps reflex however his strength is preserved and there are no other neurologic deficits.  Treatment will involve soft tissue and spinal manipulation which he has responded well to in the past.  CS radiograph January 2024 showed mild degenerative changes at C5/6 which appears to correlate with symptoms. Recommended ongoing card and we can obtain C/S MRI if needed if symptoms don't continue to improve or he develops any weakness or more significant neurological deficits.     Visits this year: 2    Subjective   Patient reports that his neck and lower back are doing fairly well only with the constitutional mild pain.  Notes left elbow pain and difficulty fully extending the elbow.  Was shoveling snow and also bumped his elbow on a pole.  Denies feeling any electrical or numb sensation in the forearm or hand after bumping his elbow but notes feeling like it is bruised on the bone on the back of his elbow    Left wrist pain 5/21/24 -patient endorses 1 month history of left wrist pain which was initially in the middle of the wrist but now is more in the lateral aspect of the wrist.  Has been aggravated by working using his hands.  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  Denies any specific trauma or injury. Pain typically mild. Has improved since our previous visit when he first noted it.    HPI - Neck pain 12/26/2023 -patient endorses 1 month insidious onset of neck pain left cervical thoracic pain and periscapular pain with occasional tingling in the left upper extremity and hand.  Symptoms came on without specific injury although he endorses a potentially unrelated issue of the left distal biceps/medial elbow.  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  Notes that his symptoms are exacerbated when extending his neck looking up.  Previously has benefited from chiropractic spinal manipulation but has not had this treatment recently. Prior history of low back pain and herniated disc and sciatic pain but this is currently not bothering him    LBP 4/23/2024 - Endorses chronic occasional LBP. Previously had more severe symptoms years prior which resolved with chiropractic care.     Review of Systems   Constitutional:  Negative for fever.   Eyes:  Negative for visual disturbance.   Respiratory:  Negative for shortness of breath.    Cardiovascular:  Negative for chest pain.   Gastrointestinal:  Negative for diarrhea, nausea and vomiting.   Genitourinary:  Negative for difficulty urinating and dysuria.   Skin:  Negative for color change.   Neurological:  Negative for dizziness.   Psychiatric/Behavioral:  Negative for agitation.    All other systems reviewed and are negative.    Objective   Examination findings (e.g., palpation & ROM): Decreased C/S and L/S ROM with pain, hypertonic and tender cervical erectors, and R LS erectors and R QL  SLR BL 65  -ve FAIR BL  Mild tenderness L olecranon, lateral epicondyle, restricted pa glide L radial head    Segmental joint dysfunction was identified in the following areas using motion palpation and/or pain provocation assessment:  Cervical: 7  Thoracic: 4-7  Lumbopelvic: 1, BL SIJ    Physical Exam  Neurological:      Mental Status: He is alert.      Sensory: Sensation is intact.      Motor: Motor function is intact.      Coordination: Coordination is intact.      Gait: Gait is intact.      Deep Tendon Reflexes:      Reflex Scores:       Tricep reflexes are 2+ on the right side and 2+ on the left side.       Bicep reflexes are 2+ on the right side and 2+ on the left side.       Brachioradialis reflexes are 2+ on the right  side and 2+ on the left side.       Patellar reflexes are 2+ on the right side and 2+ on the left side.       Achilles reflexes are 2+ on the right side and 2+ on the left side.     Comments: 4/23/24       Plan   Today's treatment:  SMT to regions of segmental dysfunction identified on exam, using age-appropriate force, and manual diversified technique.   Manual dynamic stretching of the gluteal muscles, hamstrings, upper trapezius  STM to patient tolerance to hypertonic paraspinal muscles, L wrist extensors  2:40 to 2:55 PM  Patient noted reduced pain and improved mobility post-treatment     Treatment Plan:   The patient and I discussed the risks and benefits of chiropractic care. Based on the patient's subjective complaints along with the examination findings, it is advised that a course of chiropractic treatment by initiated. The patient provided consent for care. The patient tolerated today's treatment with little or no additional discomfort and was instructed to contact the office for questions or concerns. Will see patient once per week then every 2 weeks when symptoms become mild/manageable, further spaced apart contingent upon improvement.     This chart note was generated using dictation software, and as such, there may be typographical errors present. Abbreviations: Cervical spine (CS), cervical-thoracic (CT), Dry needling (DN), Flexion adduction internal rotation (FAIR), high velocity, low amplitude (HVLA), Lumbar spine (LS), Soft tissue manipulation (STM), spinal manipulative therapy (SMT), Straight leg raise (SLR), Thoracic spine (TS).

## 2025-01-14 ENCOUNTER — APPOINTMENT (OUTPATIENT)
Dept: INTEGRATIVE MEDICINE | Facility: CLINIC | Age: 45
End: 2025-01-14
Payer: COMMERCIAL

## 2025-01-14 DIAGNOSIS — M99.03 SOMATIC DYSFUNCTION OF LUMBAR REGION: Primary | ICD-10-CM

## 2025-01-14 DIAGNOSIS — M99.02 SOMATIC DYSFUNCTION OF THORACIC REGION: ICD-10-CM

## 2025-01-14 DIAGNOSIS — G89.29 CHRONIC BILATERAL LOW BACK PAIN WITHOUT SCIATICA: ICD-10-CM

## 2025-01-14 DIAGNOSIS — M99.01 CERVICAL SEGMENT DYSFUNCTION: ICD-10-CM

## 2025-01-14 DIAGNOSIS — M99.05 SOMATIC DYSFUNCTION OF PELVIS REGION: ICD-10-CM

## 2025-01-14 DIAGNOSIS — M54.50 CHRONIC BILATERAL LOW BACK PAIN WITHOUT SCIATICA: ICD-10-CM

## 2025-01-14 DIAGNOSIS — M54.12 CERVICAL RADICULOPATHY: ICD-10-CM

## 2025-01-14 DIAGNOSIS — M79.10 MYALGIA: ICD-10-CM

## 2025-01-14 PROCEDURE — 98941 CHIROPRACT MANJ 3-4 REGIONS: CPT | Performed by: CHIROPRACTOR

## 2025-01-14 PROCEDURE — 97112 NEUROMUSCULAR REEDUCATION: CPT | Performed by: CHIROPRACTOR

## 2025-01-27 ASSESSMENT — ENCOUNTER SYMPTOMS
SHORTNESS OF BREATH: 0
VOMITING: 0
COLOR CHANGE: 0
NAUSEA: 0
FEVER: 0
DYSURIA: 0
AGITATION: 0
DIZZINESS: 0
DIFFICULTY URINATING: 0
DIARRHEA: 0

## 2025-01-27 NOTE — PROGRESS NOTES
Assessment/Plan   Patient's neck and left periscapular pain is consistent with possible mild cervical radiculopathy considering he has slight diminished triceps reflex however his strength is preserved and there are no other neurologic deficits.  Treatment will involve soft tissue and spinal manipulation which he has responded well to in the past.  CS radiograph January 2024 showed mild degenerative changes at C5/6 which appears to correlate with symptoms. Recommended ongoing card and we can obtain C/S MRI if needed if symptoms don't continue to improve or he develops any weakness or more significant neurological deficits.     Visits this year: 3    Subjective   Patient reports improvement in left elbow pain which is now mild and currently not bothering him only having mild occasional pain.  His mother passed away and he had 5 days of work and that may have helped alleviate the elbow pain as he was not using his arms and hands is much.  He does note however that he had some restless sleep due to stress and this seemed to aggravate the lower back pain a little bit currently endorses mild frequent pain in the lower back locally without radiation    Left wrist pain 5/21/24 -patient endorses 1 month history of left wrist pain which was initially in the middle of the wrist but now is more in the lateral aspect of the wrist.  Has been aggravated by working using his hands.  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  Denies any specific trauma or injury. Pain typically mild. Has improved since our previous visit when he first noted it.    HPI - Neck pain 12/26/2023 -patient endorses 1 month insidious onset of neck pain left cervical thoracic pain and periscapular pain with occasional tingling in the left upper extremity and hand.  Symptoms came on without specific injury although he endorses a potentially  unrelated issue of the left distal biceps/medial elbow.  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  Notes that his symptoms are exacerbated when extending his neck looking up.  Previously has benefited from chiropractic spinal manipulation but has not had this treatment recently. Prior history of low back pain and herniated disc and sciatic pain but this is currently not bothering him    LBP 4/23/2024 - Endorses chronic occasional LBP. Previously had more severe symptoms years prior which resolved with chiropractic care.     Review of Systems   Constitutional:  Negative for fever.   Eyes:  Negative for visual disturbance.   Respiratory:  Negative for shortness of breath.    Cardiovascular:  Negative for chest pain.   Gastrointestinal:  Negative for diarrhea, nausea and vomiting.   Genitourinary:  Negative for difficulty urinating and dysuria.   Skin:  Negative for color change.   Neurological:  Negative for dizziness.   Psychiatric/Behavioral:  Negative for agitation.    All other systems reviewed and are negative.    Objective   Examination findings (e.g., palpation & ROM): Decreased C/S and L/S ROM with pain, hypertonic and tender cervical erectors, and R LS erectors and R QL  SLR BL 65  -ve FAIR BL  Mild tenderness L olecranon, lateral epicondyle, restricted pa glide L radial head    Segmental joint dysfunction was identified in the following areas using motion palpation and/or pain provocation assessment:  Cervical: 7  Thoracic: 4-5  Lumbopelvic: 1, BL SIJ    Physical Exam  Neurological:      Mental Status: He is alert.      Sensory: Sensation is intact.      Motor: Motor function is intact.      Coordination: Coordination is intact.      Gait: Gait is intact.      Deep Tendon Reflexes:      Reflex Scores:       Tricep reflexes are 2+ on the right side and 2+ on the left side.       Bicep reflexes are 2+ on the  right side and 2+ on the left side.       Brachioradialis reflexes are 2+ on the right side and 2+ on the left side.       Patellar reflexes are 2+ on the right side and 2+ on the left side.       Achilles reflexes are 2+ on the right side and 2+ on the left side.     Comments: 4/23/24       Plan   Today's treatment:  SMT to regions of segmental dysfunction identified on exam, using age-appropriate force, and manual diversified technique.   Manual dynamic stretching of the gluteal muscles, hamstrings, upper trapezius  STM to patient tolerance to hypertonic paraspinal muscles, L wrist extensors  2:40 to 2:56 PM  Patient noted reduced pain and improved mobility post-treatment     Treatment Plan:   The patient and I discussed the risks and benefits of chiropractic care. Based on the patient's subjective complaints along with the examination findings, it is advised that a course of chiropractic treatment by initiated. The patient provided consent for care. The patient tolerated today's treatment with little or no additional discomfort and was instructed to contact the office for questions or concerns. Will see patient once per week then every 2 weeks when symptoms become mild/manageable, further spaced apart contingent upon improvement.     This chart note was generated using dictation software, and as such, there may be typographical errors present. Abbreviations: Cervical spine (CS), cervical-thoracic (CT), Dry needling (DN), Flexion adduction internal rotation (FAIR), high velocity, low amplitude (HVLA), Lumbar spine (LS), Soft tissue manipulation (STM), spinal manipulative therapy (SMT), Straight leg raise (SLR), Thoracic spine (TS).

## 2025-01-28 ENCOUNTER — APPOINTMENT (OUTPATIENT)
Dept: INTEGRATIVE MEDICINE | Facility: CLINIC | Age: 45
End: 2025-01-28
Payer: COMMERCIAL

## 2025-01-28 DIAGNOSIS — M99.05 SOMATIC DYSFUNCTION OF PELVIS REGION: ICD-10-CM

## 2025-01-28 DIAGNOSIS — M99.01 CERVICAL SEGMENT DYSFUNCTION: ICD-10-CM

## 2025-01-28 DIAGNOSIS — M79.10 MYALGIA: ICD-10-CM

## 2025-01-28 DIAGNOSIS — M99.03 SOMATIC DYSFUNCTION OF LUMBAR REGION: Primary | ICD-10-CM

## 2025-01-28 DIAGNOSIS — M99.02 SOMATIC DYSFUNCTION OF THORACIC REGION: ICD-10-CM

## 2025-01-28 DIAGNOSIS — M54.50 CHRONIC BILATERAL LOW BACK PAIN WITHOUT SCIATICA: ICD-10-CM

## 2025-01-28 DIAGNOSIS — G89.29 CHRONIC BILATERAL LOW BACK PAIN WITHOUT SCIATICA: ICD-10-CM

## 2025-01-28 PROCEDURE — 98941 CHIROPRACT MANJ 3-4 REGIONS: CPT | Performed by: CHIROPRACTOR

## 2025-01-28 PROCEDURE — 97140 MANUAL THERAPY 1/> REGIONS: CPT | Performed by: CHIROPRACTOR

## 2025-02-13 ENCOUNTER — APPOINTMENT (OUTPATIENT)
Dept: INTEGRATIVE MEDICINE | Facility: CLINIC | Age: 45
End: 2025-02-13
Payer: COMMERCIAL

## 2025-02-24 ASSESSMENT — ENCOUNTER SYMPTOMS
VOMITING: 0
DIFFICULTY URINATING: 0
NAUSEA: 0
COLOR CHANGE: 0
FEVER: 0
DIARRHEA: 0
DIZZINESS: 0
AGITATION: 0
DYSURIA: 0
SHORTNESS OF BREATH: 0

## 2025-02-25 ENCOUNTER — APPOINTMENT (OUTPATIENT)
Dept: INTEGRATIVE MEDICINE | Facility: CLINIC | Age: 45
End: 2025-02-25
Payer: COMMERCIAL

## 2025-02-25 DIAGNOSIS — M99.01 CERVICAL SEGMENT DYSFUNCTION: ICD-10-CM

## 2025-02-25 DIAGNOSIS — M99.02 SOMATIC DYSFUNCTION OF THORACIC REGION: ICD-10-CM

## 2025-02-25 DIAGNOSIS — G89.29 CHRONIC BILATERAL LOW BACK PAIN WITHOUT SCIATICA: ICD-10-CM

## 2025-02-25 DIAGNOSIS — M54.12 CERVICAL RADICULOPATHY: ICD-10-CM

## 2025-02-25 DIAGNOSIS — M99.05 SOMATIC DYSFUNCTION OF PELVIS REGION: ICD-10-CM

## 2025-02-25 DIAGNOSIS — M99.03 SOMATIC DYSFUNCTION OF LUMBAR REGION: Primary | ICD-10-CM

## 2025-02-25 DIAGNOSIS — M79.10 MYALGIA: ICD-10-CM

## 2025-02-25 DIAGNOSIS — M54.50 CHRONIC BILATERAL LOW BACK PAIN WITHOUT SCIATICA: ICD-10-CM

## 2025-03-24 DIAGNOSIS — R53.82 CHRONIC FATIGUE: ICD-10-CM

## 2025-03-24 DIAGNOSIS — Z12.5 PROSTATE CANCER SCREENING: ICD-10-CM

## 2025-03-24 DIAGNOSIS — E55.9 HYPOVITAMINOSIS D: Primary | ICD-10-CM

## 2025-03-24 DIAGNOSIS — E78.5 DYSLIPIDEMIA: ICD-10-CM

## 2025-03-24 DIAGNOSIS — Z00.00 ANNUAL PHYSICAL EXAM: ICD-10-CM

## 2025-04-14 ENCOUNTER — TELEPHONE (OUTPATIENT)
Dept: PRIMARY CARE | Facility: CLINIC | Age: 45
End: 2025-04-14
Payer: COMMERCIAL

## 2025-04-14 DIAGNOSIS — R53.82 CHRONIC FATIGUE: Primary | ICD-10-CM

## 2025-04-14 NOTE — TELEPHONE ENCOUNTER
Patient called in requesting labs to check his thyroid and testosterone levels. He stated he had discussed checking his thyroid at his last appointment. Patient noted his energy has been low and would like to have his testosterone checked to rule this out  Please Advise

## 2025-04-18 LAB
TESTOST FREE SERPL-MCNC: NORMAL PG/ML
TESTOST SERPL-MCNC: NORMAL NG/DL
TSH SERPL-ACNC: 2.01 MIU/L (ref 0.4–4.5)

## 2025-04-19 LAB
25(OH)D3+25(OH)D2 SERPL-MCNC: 54 NG/ML (ref 30–100)
ALBUMIN SERPL-MCNC: 4.7 G/DL (ref 3.6–5.1)
ALP SERPL-CCNC: 61 U/L (ref 36–130)
ALT SERPL-CCNC: 28 U/L (ref 9–46)
ANION GAP SERPL CALCULATED.4IONS-SCNC: 11 MMOL/L (CALC) (ref 7–17)
AST SERPL-CCNC: 23 U/L (ref 10–40)
BILIRUB SERPL-MCNC: 0.8 MG/DL (ref 0.2–1.2)
BUN SERPL-MCNC: 15 MG/DL (ref 7–25)
CALCIUM SERPL-MCNC: 9.9 MG/DL (ref 8.6–10.3)
CHLORIDE SERPL-SCNC: 102 MMOL/L (ref 98–110)
CO2 SERPL-SCNC: 25 MMOL/L (ref 20–32)
CREAT SERPL-MCNC: 0.99 MG/DL (ref 0.6–1.29)
EGFRCR SERPLBLD CKD-EPI 2021: 96 ML/MIN/1.73M2
GLUCOSE SERPL-MCNC: 98 MG/DL (ref 65–99)
LDLC SERPL DIRECT ASSAY-MCNC: 212 MG/DL
POTASSIUM SERPL-SCNC: 4.5 MMOL/L (ref 3.5–5.3)
PROT SERPL-MCNC: 7.4 G/DL (ref 6.1–8.1)
PSA SERPL-MCNC: 0.48 NG/ML
SODIUM SERPL-SCNC: 138 MMOL/L (ref 135–146)
VIT B12 SERPL-MCNC: 479 PG/ML (ref 200–1100)

## 2025-04-21 ENCOUNTER — APPOINTMENT (OUTPATIENT)
Dept: PRIMARY CARE | Facility: CLINIC | Age: 45
End: 2025-04-21
Payer: COMMERCIAL

## 2025-04-21 VITALS
WEIGHT: 187 LBS | SYSTOLIC BLOOD PRESSURE: 112 MMHG | DIASTOLIC BLOOD PRESSURE: 74 MMHG | OXYGEN SATURATION: 97 % | BODY MASS INDEX: 27.7 KG/M2 | RESPIRATION RATE: 16 BRPM | TEMPERATURE: 98.1 F | HEART RATE: 76 BPM | HEIGHT: 69 IN

## 2025-04-21 DIAGNOSIS — E78.5 DYSLIPIDEMIA: Primary | ICD-10-CM

## 2025-04-21 PROCEDURE — 1036F TOBACCO NON-USER: CPT | Performed by: FAMILY MEDICINE

## 2025-04-21 PROCEDURE — 3008F BODY MASS INDEX DOCD: CPT | Performed by: FAMILY MEDICINE

## 2025-04-21 PROCEDURE — 99213 OFFICE O/P EST LOW 20 MIN: CPT | Performed by: FAMILY MEDICINE

## 2025-04-21 ASSESSMENT — PATIENT HEALTH QUESTIONNAIRE - PHQ9
1. LITTLE INTEREST OR PLEASURE IN DOING THINGS: NOT AT ALL
SUM OF ALL RESPONSES TO PHQ9 QUESTIONS 1 AND 2: 0
2. FEELING DOWN, DEPRESSED OR HOPELESS: NOT AT ALL

## 2025-04-21 NOTE — PROGRESS NOTES
"Subjective   Patient ID: Aguila Jerry is a 44 y.o. male who presents for Annual Exam  HPI  44-year-old gentleman is here to review recent lab instead of an annual exam he did have some fatigue and we did extensive review in regards to his laboratory investigations please see below otherwise no chest pain short of breath or palpitations.  History of dyslipidemia needs to improve his low-fat diet otherwise no chest pain shortness of palpitations no new GI/ issues  Review of Systems   Constitutional:  Positive for fatigue. Negative for fever and unexpected weight change.   Eyes:  Negative for visual disturbance.   Respiratory:  Negative for cough, chest tightness, shortness of breath and wheezing.    Cardiovascular:  Negative for chest pain, palpitations and leg swelling.   Gastrointestinal:  Negative for abdominal pain, blood in stool, nausea and vomiting.   Genitourinary:  Negative for dysuria, frequency and hematuria.   Musculoskeletal:  Positive for myalgias. Negative for arthralgias and gait problem.   Skin:  Negative for rash.   Neurological:  Negative for syncope, weakness, light-headedness and headaches.   Psychiatric/Behavioral:  Negative for behavioral problems, confusion, sleep disturbance and suicidal ideas.        Objective   /74 (BP Location: Left arm, Patient Position: Sitting, BP Cuff Size: Adult)   Pulse 76   Temp 36.7 °C (98.1 °F) (Temporal)   Resp 16   Ht 1.753 m (5' 9\")   Wt 84.8 kg (187 lb)   SpO2 97%   BMI 27.62 kg/m²     Component  Ref Range & Units 3 d ago   GLUCOSE  65 - 99 mg/dL 98   Comment:               Fasting reference interval      UREA NITROGEN (BUN)  7 - 25 mg/dL 15   CREATININE  0.60 - 1.29 mg/dL 0.99   EGFR  > OR = 60 mL/min/1.73m2 96   SODIUM  135 - 146 mmol/L 138   POTASSIUM  3.5 - 5.3 mmol/L 4.5   CHLORIDE  98 - 110 mmol/L 102   CARBON DIOXIDE  20 - 32 mmol/L 25   ELECTROLYTE BALANCE  7 - 17 mmol/L (calc) 11   CALCIUM  8.6 - 10.3 mg/dL 9.9   PROTEIN, TOTAL  6.1 - " 8.1 g/dL 7.4   ALBUMIN  3.6 - 5.1 g/dL 4.7   BILIRUBIN, TOTAL  0.2 - 1.2 mg/dL 0.8   ALKALINE PHOSPHATASE  36 - 130 U/L 61   AST  10 - 40 U/L 23   ALT  9 - 46 U/L 28   Resulting Agency [facility]              Narrative  Performed by: [facility] [place], operated by [facility]  FASTING:YES    FASTING: YES   Specimen Collected: 04/18/25 11:21 Last Resulted: 04/19/25 07:29         Component  Ref Range & Units 3 d ago   DIRECT LDL  <100 mg/dL 212 High    Comment: Greatly elevated Triglycerides values (>1200 mg/dL)  interfere with the dLDL assay.  LDL-C levels > or = 190 mg/dL may indicate familial  hypercholesterolemia (FH). Clinical assessment and  measurement of blood lipid levels should be  considered for all first degree relatives of patients  with an FH diagnosis. LDL Cholesterol (LDL-C)  levels > or = 300 mg/dL may indicate homozygous  familial hypercholesterolemia (HoFH). Untreated,  these extremely high LDL-C levels can result in  premature CV events and mortality. Patients should  be identified early and provided appropriate  interventions to reduce the cumulative LDL-C  burden from birth.     For questions about testing for familial  hypercholesterolemia, please call [facility] at 1.566.GENE.INFO.     Liu T, et al. J National Lipid Association  Recommendations for Patient-Centered  Management of Dyslipidemia: Part 1 Journal of  Clinical Lipidology 2015;9(2), 129-169.  FELI Glover et al. (2014). Homozygous familial  hypercholesterolaemia: new insights and guidance  for clinicians to improve detection and clinical  management.  Heart Journal, 35(32),  0600-4212.     Desirable range <100 mg/dL for primary prevention;    <70 mg/dL for patients with CHD or diabetic patients  with > or = 2 CHD risk factors.      Resulting Agency [facility]              Narrative  Performed by: [facility] [place], [facility]  FASTING:YES    FASTING:  YES   Specimen Collected: 04/18/25 11:21 Last Resulted: 04/19/25 07:29         Component  Ref Range & Units 3 d ago   VITAMIN D,25-OH,TOTAL,IA  30 - 100 ng/mL 54   Comment: Vitamin D Status         25-OH Vitamin D:     Deficiency:                    <20 ng/mL  Insufficiency:             20 - 29 ng/mL  Optimal:                 > or = 30 ng/mL     For 25-OH Vitamin D testing on patients on  D2-supplementation and patients for whom quantitation  of D2 and D3 fractions is required, the QuestAssureD(TM)  25-OH VIT D, (D2,D3), LC/MS/MS is recommended: order  code 87141 (patients >2yrs).     See Note 1     Note 1     For additional information, please refer to  http://Cheers In.Reddit/faq/TQM271  (This link is being provided for informational/  educational purposes only.)   Resulting Agency [facility]              Narrative  Performed by: [facility] [place]  FASTING:YES    FASTING: YES   Specimen Collected: 04/18/25 11:21 Last Resulted: 04/19/25 07:29     Lab Results   Component Value Date    PSA 0.48 04/18/2025    PSA 0.5 06/26/2023     Lab Results   Component Value Date    [id number] 04/18/2025     Lab Results   Component Value Date    TSH 2.01 04/18/2025   Testosterone, total and free  Order: [id number]   Status: Final result       Dx: Chronic fatigue    Test Result Released: Yes (seen)    0 Result Notes      Component  Ref Range & Units 6 d ago   TESTOSTERONE, TOTAL, MS  250 - 1,100 ng/dL 640   Comment:    For additional information, please refer to  http://education.enMarkit/faq/  TotalTe       Contains abnormal data Lipid Panel  Order: [id number]   Status: Final result    Test Result Released: Yes (seen)    0 Result Notes       1  Topic        Component  Ref Range & Units 2 yr ago  (7/8/22) 4 yr ago  (3/6/21) 4 yr ago  (11/9/20)   Cholesterol  0 - 199 mg/dL 225 High  227 High   High  CM   Comment: .      AGE      DESIRABLE   BORDERLINE  HIGH   HIGH     0-19 Y     0 - 169       170 - 199     >/= 200    20-24 Y     0 - 189       190 - 224     >/= 225        >24 Y     0 - 199       200 - 239     >/= 240   **All ranges are based on fasting samples. Specific   therapeutic targets will vary based on patient-specific   cardiac risk.  .   Pediatric guidelines reference:Pediatrics 2011, 128(S5).   Adult guidelines reference: NCEP ATPIII Guidelines,    ANGEL 2001, 258:2486-97  .   Venipuncture immediately after or during the   administration of Metamizole may lead to falsely   low results. Testing should be performed immediately   prior to Metamizole dosing.   HDL  mg/dL 61.0 52.0 CM 58.0 CM   Comment: .      AGE      VERY LOW   LOW     NORMAL    HIGH       0-19 Y       < 35   < 40     40-45     ----    20-24 Y       ----   < 40       >45     ----      >24 Y       ----   < 40     40-60      >60  .   Cholesterol/HDL Ratio 3.7 4.4 CM 4.0 CM   Comment: REF VALUES  DESIRABLE  < 3.4  HIGH RISK  > 5.0   LDL  0 - 99 mg/dL 141 High  153 High   High  CM   Comment: .                             Physical Exam  Sign reviewed and normal about six 7 pound weight gain in the last year  General Spectrila pleasant interactive individual in no distress  Mood mood is stable considering a lot of social and domestic stressors.  No confusion  Cardiovascular RSR without murmurs gallop or ectopy  Chest chest is clear  Neck no adenopathy no bruits  Extensive review in regards to multiple above-mentioned lab results only abnormality is the cholesterol      Assessment/Plan   Problem List Items Addressed This Visit    None  Regarding normal testosterone, normal PSA, normal chemistry, normal vitamin D, normal B12, thyroid  Cannot explain totally what his LDL went in 1 year from 141 up to 212 and I will have him improve his diet and recheck his LDL in about 2 to 3 weeks and follow-up 4 weeks for a more extensive exam annually and to check if lab air all questions were addressed I  did review the above lab in detail and will continue good diet

## 2025-04-22 LAB
TESTOST FREE SERPL-MCNC: 69.1 PG/ML (ref 35–155)
TESTOST SERPL-MCNC: 640 NG/DL (ref 250–1100)
TSH SERPL-ACNC: 2.01 MIU/L (ref 0.4–4.5)

## 2025-04-24 PROBLEM — K75.0 ABSCESS OF LIVER (HHS-HCC): Status: RESOLVED | Noted: 2023-12-15 | Resolved: 2025-04-24

## 2025-04-24 PROBLEM — K57.92 ACUTE DIVERTICULITIS: Status: RESOLVED | Noted: 2023-12-15 | Resolved: 2025-04-24

## 2025-04-24 PROBLEM — R07.89 ATYPICAL CHEST PAIN: Status: RESOLVED | Noted: 2023-12-15 | Resolved: 2025-04-24

## 2025-04-24 ASSESSMENT — ENCOUNTER SYMPTOMS
DYSURIA: 0
BLOOD IN STOOL: 0
ARTHRALGIAS: 0
FREQUENCY: 0
SLEEP DISTURBANCE: 0
MYALGIAS: 1
WHEEZING: 0
LIGHT-HEADEDNESS: 0
FEVER: 0
HEMATURIA: 0
FATIGUE: 1
HEADACHES: 0
WEAKNESS: 0
UNEXPECTED WEIGHT CHANGE: 0
PALPITATIONS: 0
VOMITING: 0
CHEST TIGHTNESS: 0
NAUSEA: 0
COUGH: 0
ABDOMINAL PAIN: 0
SHORTNESS OF BREATH: 0
CONFUSION: 0

## 2025-05-01 ENCOUNTER — APPOINTMENT (OUTPATIENT)
Dept: INTEGRATIVE MEDICINE | Facility: CLINIC | Age: 45
End: 2025-05-01
Payer: COMMERCIAL

## 2025-05-06 LAB — LDLC SERPL DIRECT ASSAY-MCNC: 148 MG/DL

## 2025-05-12 ENCOUNTER — APPOINTMENT (OUTPATIENT)
Dept: PRIMARY CARE | Facility: CLINIC | Age: 45
End: 2025-05-12
Payer: COMMERCIAL

## 2025-05-12 VITALS
HEIGHT: 69 IN | WEIGHT: 182 LBS | DIASTOLIC BLOOD PRESSURE: 64 MMHG | TEMPERATURE: 97 F | SYSTOLIC BLOOD PRESSURE: 108 MMHG | HEART RATE: 62 BPM | BODY MASS INDEX: 26.96 KG/M2 | RESPIRATION RATE: 16 BRPM | OXYGEN SATURATION: 96 %

## 2025-05-12 DIAGNOSIS — B37.0 ORAL THRUSH: Primary | ICD-10-CM

## 2025-05-12 DIAGNOSIS — R73.9 HYPERGLYCEMIA: ICD-10-CM

## 2025-05-12 DIAGNOSIS — E78.5 DYSLIPIDEMIA: ICD-10-CM

## 2025-05-12 PROCEDURE — 99212 OFFICE O/P EST SF 10 MIN: CPT | Performed by: FAMILY MEDICINE

## 2025-05-12 PROCEDURE — 1036F TOBACCO NON-USER: CPT | Performed by: FAMILY MEDICINE

## 2025-05-12 PROCEDURE — 3008F BODY MASS INDEX DOCD: CPT | Performed by: FAMILY MEDICINE

## 2025-05-12 RX ORDER — FLUCONAZOLE 100 MG/1
100 TABLET ORAL DAILY
Qty: 6 TABLET | Refills: 0 | Status: SHIPPED | OUTPATIENT
Start: 2025-05-12 | End: 2025-05-18

## 2025-05-12 NOTE — PROGRESS NOTES
"Subjective   Patient ID: Aguila Jerry is a 44 y.o. male who presents for Hyperlipidemia (2 week follow up ).  HPI  44-year-old gentleman here to recheck unusually elevated LDL bilateral 3 to 4 weeks ago with at 212.  Thankfully repeat is 148 and after review of the last 3 years his LDL is running between 140 and 150.  We did review local fat diet and declines statin therapy after discussion and prefer to improve low-fat diet and then recheck progress in 4 months Lebron has thrush and burning of the tongue did better on Diflucan couple weeks back but still has burning of the tongue.  I did review perhaps taking Diflucan for 5 to 6 days at low-dose to better eradicate the intraoral and upper airway  Otherwise health been stable  Review of Systems   Constitutional:  Negative for fatigue and fever.   Eyes:  Negative for visual disturbance.   Respiratory:  Negative for cough, chest tightness and shortness of breath.    Cardiovascular:  Negative for chest pain, palpitations and leg swelling.   Gastrointestinal:  Negative for abdominal pain and blood in stool.   Genitourinary:  Negative for dysuria, flank pain, frequency and hematuria.   Musculoskeletal:  Positive for myalgias. Negative for arthralgias and joint swelling.   Skin:  Negative for rash.   Neurological:  Negative for weakness, light-headedness, numbness and headaches.   Psychiatric/Behavioral:  Negative for behavioral problems, confusion, decreased concentration, sleep disturbance and suicidal ideas.        Objective   /64 (BP Location: Right arm, Patient Position: Sitting, BP Cuff Size: Adult)   Pulse 62   Temp 36.1 °C (97 °F) (Temporal)   Resp 16   Ht 1.753 m (5' 9\")   Wt 82.6 kg (182 lb)   SpO2 96%   BMI 26.88 kg/m²          Component  Ref Range & Units 7 d ago 3 wk ago   DIRECT LDL  <100 mg/dL 148 High  212 High  CM   Comment: Greatly elevated Triglycerides values (>1200 mg/dL)  interfere with the dLDL assay.     Desirable range <100 mg/dL for " primary prevention;    <70 mg/dL for patients with CHD or diabetic patients  with > or = 2 CHD risk factors.      Resulting Agency [facility]              Narrative  [facility]formed [facility]: [facility]  FASTING:YES    FASTING: YES   Specimen Collected: 05/05/25 12:32 Last Resulted: 05/06/25 11:12     Physical Exam  Send reviewed normal good weight loss but 4 pounds in the last 2 months  Heart regular sinus rhythm without murmurs or ectopy  Chest is clear neck is clear  Spent great deal reviewing a low-fat diet since his repeat was much improved with the probable lab error of 212 earlier.  Patient does have his LDL in the last 2 to 3 years pounds we do not 140 and 150 and he is currently at 48      Assessment/Plan   Problem List Items Addressed This Visit       Dyslipidemia   As above discussed the above slightly elevated LDL would prefer Gonzalez at this time cholesterol pills but will improve his low-fat diet and recheck in 4 months fasting for his LDL.  Otherwise health been stable no new issues---Mild redness and edema and will place on 6 days of low-dose Diflucan --will check A1c with history of a mild hyperglycemia  @discharge  The above diagnosis and treatment plan was discussed with the patient patient will continue appropriate diet and exercise as reviewed  Patient will recheck earlier if any interval problems of significance or clinical worsening of the above problems.  Agrees above surveillance.  All question were addressed regarding above meds

## 2025-05-15 ASSESSMENT — ENCOUNTER SYMPTOMS
FEVER: 0
HEMATURIA: 0
NUMBNESS: 0
CONFUSION: 0
CHEST TIGHTNESS: 0
JOINT SWELLING: 0
LIGHT-HEADEDNESS: 0
PALPITATIONS: 0
SLEEP DISTURBANCE: 0
DECREASED CONCENTRATION: 0
ARTHRALGIAS: 0
FATIGUE: 0
BLOOD IN STOOL: 0
HEADACHES: 0
DYSURIA: 0
MYALGIAS: 1
SHORTNESS OF BREATH: 0
FLANK PAIN: 0
COUGH: 0
ABDOMINAL PAIN: 0
WEAKNESS: 0
FREQUENCY: 0

## 2025-05-20 ENCOUNTER — APPOINTMENT (OUTPATIENT)
Dept: INTEGRATIVE MEDICINE | Facility: CLINIC | Age: 45
End: 2025-05-20
Payer: COMMERCIAL

## 2025-06-02 ASSESSMENT — ENCOUNTER SYMPTOMS
DYSURIA: 0
NAUSEA: 0
COLOR CHANGE: 0
AGITATION: 0
FEVER: 0
DIFFICULTY URINATING: 0
DIZZINESS: 0
SHORTNESS OF BREATH: 0
DIARRHEA: 0
VOMITING: 0

## 2025-06-03 ENCOUNTER — APPOINTMENT (OUTPATIENT)
Dept: INTEGRATIVE MEDICINE | Facility: CLINIC | Age: 45
End: 2025-06-03
Payer: COMMERCIAL

## 2025-06-03 DIAGNOSIS — M99.01 CERVICAL SEGMENT DYSFUNCTION: ICD-10-CM

## 2025-06-03 DIAGNOSIS — M79.10 MYALGIA: ICD-10-CM

## 2025-06-03 DIAGNOSIS — M99.02 SOMATIC DYSFUNCTION OF THORACIC REGION: ICD-10-CM

## 2025-06-03 DIAGNOSIS — G89.29 CHRONIC BILATERAL LOW BACK PAIN WITHOUT SCIATICA: ICD-10-CM

## 2025-06-03 DIAGNOSIS — M54.50 CHRONIC BILATERAL LOW BACK PAIN WITHOUT SCIATICA: ICD-10-CM

## 2025-06-03 DIAGNOSIS — M99.03 SOMATIC DYSFUNCTION OF LUMBAR REGION: Primary | ICD-10-CM

## 2025-06-03 DIAGNOSIS — M99.05 SOMATIC DYSFUNCTION OF PELVIS REGION: ICD-10-CM

## 2025-06-03 PROCEDURE — 97112 NEUROMUSCULAR REEDUCATION: CPT | Performed by: CHIROPRACTOR

## 2025-06-03 PROCEDURE — 98941 CHIROPRACT MANJ 3-4 REGIONS: CPT | Performed by: CHIROPRACTOR

## 2025-06-03 NOTE — PROGRESS NOTES
Assessment/Plan   Patient's neck and left periscapular pain is consistent with possible mild cervical radiculopathy considering he has slight diminished triceps reflex however his strength is preserved and there are no other neurologic deficits.  Treatment will involve soft tissue and spinal manipulation which he has responded well to in the past.  CS radiograph January 2024 showed mild degenerative changes at C5/6 which appears to correlate with symptoms. Recommended ongoing card and we can obtain C/S MRI if needed if symptoms don't continue to improve or he develops any weakness or more significant neurological deficits. L shoulder pain possible impingement / supraspinatus tendinitis.    Visits this year: 4    Subjective   Patient reports BL shoulder pain and stiffness. He has discomfort with overhead movement and repetitive lifting with arms over shoulder level. Neck and LBP are mild and occasional. Pain localized suprascapular regions either side and worse side varies day to day.    Left wrist pain 5/21/24 -patient endorses 1 month history of left wrist pain which was initially in the middle of the wrist but now is more in the lateral aspect of the wrist.  Has been aggravated by working using his hands.  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  Denies any specific trauma or injury. Pain typically mild. Has improved since our previous visit when he first noted it.    HPI - Neck pain 12/26/2023 -patient endorses 1 month insidious onset of neck pain left cervical thoracic pain and periscapular pain with occasional tingling in the left upper extremity and hand.  Symptoms came on without specific injury although he endorses a potentially unrelated issue of the left distal biceps/medial elbow.  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  Notes that his symptoms are exacerbated when extending his neck looking up.  Previously has benefited from chiropractic spinal manipulation but has not had this treatment recently. Prior history of low back pain and herniated disc and sciatic pain but this is currently not bothering him    LBP 4/23/2024 - Endorses chronic occasional LBP. Previously had more severe symptoms years prior which resolved with chiropractic care.     Review of Systems   Constitutional:  Negative for fever.   Eyes:  Negative for visual disturbance.   Respiratory:  Negative for shortness of breath.    Cardiovascular:  Negative for chest pain.   Gastrointestinal:  Negative for diarrhea, nausea and vomiting.   Genitourinary:  Negative for difficulty urinating and dysuria.   Skin:  Negative for color change.   Neurological:  Negative for dizziness.   Psychiatric/Behavioral:  Negative for agitation.    All other systems reviewed and are negative.    Objective   Examination findings (e.g., palpation & ROM): Decreased C/S and L/S ROM with pain, hypertonic and tender cervical erectors, and R LS erectors and R QL  SLR BL 65  -ve FAIR BL  Mild tenderness L olecranon, lateral epicondyle, restricted pa glide L radial head  Tender L supraspinatus, mild dec L radha IR, -ve crank/clunk    Segmental joint dysfunction was identified in the following areas using motion palpation and/or pain provocation assessment:  Cervical: 7  Thoracic: 4-5  Lumbopelvic: 1, BL SIJ    Physical Exam  Neurological:      Mental Status: He is alert.      Sensory: Sensation is intact.      Motor: Motor function is intact.      Coordination: Coordination is intact.      Gait: Gait is intact.      Deep Tendon Reflexes:      Reflex Scores:       Tricep reflexes are 2+ on the right side and 2+ on the left side.       Bicep reflexes are 2+ on the right side and 2+ on the left side.       Brachioradialis reflexes are 2+ on the right side and 2+ on  the left side.       Patellar reflexes are 2+ on the right side and 2+ on the left side.       Achilles reflexes are 2+ on the right side and 2+ on the left side.     Comments: 4/23/24     Plan   Today's treatment:  SMT to regions of segmental dysfunction identified on exam, using age-appropriate force, and manual diversified technique.   Manual dynamic stretching of the gluteal muscles, hamstrings, upper trapezius  Dry needling (15 in, 15 out) to region of the chief complaint / hypertonic muscles identified upon palpation in BL supraspinatus  3 to 3:15 PM  Patient noted reduced pain and improved mobility post-treatment     Treatment Plan:   The patient and I discussed the risks and benefits of chiropractic care. Based on the patient's subjective complaints along with the examination findings, it is advised that a course of chiropractic treatment by initiated. The patient provided consent for care. The patient tolerated today's treatment with little or no additional discomfort and was instructed to contact the office for questions or concerns. Will see patient once per week then every 2 weeks when symptoms become mild/manageable, further spaced apart contingent upon improvement.     This chart note was generated using dictation software, and as such, there may be typographical errors present. Abbreviations: Cervical spine (CS), cervical-thoracic (CT), Dry needling (DN), Flexion adduction internal rotation (FAIR), high velocity, low amplitude (HVLA), Lumbar spine (LS), Soft tissue manipulation (STM), spinal manipulative therapy (SMT), Straight leg raise (SLR), Thoracic spine (TS).

## 2025-06-09 ASSESSMENT — ENCOUNTER SYMPTOMS
DYSURIA: 0
COLOR CHANGE: 0
VOMITING: 0
NAUSEA: 0
SHORTNESS OF BREATH: 0
AGITATION: 0
FEVER: 0
DIZZINESS: 0
DIFFICULTY URINATING: 0
DIARRHEA: 0

## 2025-06-09 NOTE — PROGRESS NOTES
Assessment/Plan   Patient's LBP is possibly discogenic.  Treatment will involve soft tissue and spinal manipulation which he has responded well to in the past.  CS radiograph January 2024 showed mild degenerative changes at C5/6 and he had radicular symptoms at the time, but these have improved. Recommended ongoing card and we can obtain C/S MRI if needed if symptoms don't continue to improve or he develops any weakness or more significant neurological deficits. L shoulder pain possible impingement / supraspinatus tendinitis.    Visits this year: 5    Subjective   Patient patient reports improvement in his shoulder pain noting that the left shoulder is feeling much better but still has some residual pain in the right shoulder which is mild and occasionally moderate.  He has still been doing some overhead lifting at work but less than weeks prior.  Notes that he is dealing with 4 out of 10 localized low back pain and tightness which is constant.  Was brought on by prolonged sitting on the floor helping with various tasks then he realized after leaning forward that it was starting to hurt him so he stood up.    Left wrist pain 5/21/24 -patient endorses 1 month history of left wrist pain which was initially in the middle of the wrist but now is more in the lateral aspect of the wrist.  Has been aggravated by working using his hands.  He works in [employer] line and is repetitively using the left hand for several hours per day and up to 9 or 10 days in a row as he is working a lot of overtime.  Denies any specific trauma or injury. Pain typically mild. Has improved since our previous visit when he first noted it.    HPI - Neck pain 12/26/2023 -patient endorses 1 month insidious onset of neck pain left cervical thoracic pain and periscapular pain with occasional tingling in the left upper extremity and hand.  Symptoms came on without specific injury although he endorses a potentially unrelated issue of the left distal  biceps/medial elbow.  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  Notes that his symptoms are exacerbated when extending his neck looking up.  Previously has benefited from chiropractic spinal manipulation but has not had this treatment recently. Prior history of low back pain and herniated disc and sciatic pain but this is currently not bothering him    LBP 4/23/2024 - Endorses chronic occasional LBP. Previously had more severe symptoms years prior which resolved with chiropractic care.     Review of Systems   Constitutional:  Negative for fever.   Eyes:  Negative for visual disturbance.   Respiratory:  Negative for shortness of breath.    Cardiovascular:  Negative for chest pain.   Gastrointestinal:  Negative for diarrhea, nausea and vomiting.   Genitourinary:  Negative for difficulty urinating and dysuria.   Skin:  Negative for color change.   Neurological:  Negative for dizziness.   Psychiatric/Behavioral:  Negative for agitation.    All other systems reviewed and are negative.    Objective   Examination findings (e.g., palpation & ROM): Decreased C/S and L/S ROM with pain, hypertonic and tender cervical erectors, and R LS erectors and R QL  SLR BL 65  -ve FAIR BL  Mild tenderness L olecranon, lateral epicondyle, restricted pa glide L radial head  Tender R supraspinatus, mild dec L radha IR, -ve crank/clunk    Segmental joint dysfunction was identified in the following areas using motion palpation and/or pain provocation assessment:  Cervical: 7  Thoracic: 4-7  Lumbopelvic: 1, BL SIJ    Physical Exam  Neurological:      Mental Status: He is alert.      Sensory: Sensation is intact.      Motor: Motor function is intact.      Coordination: Coordination is intact.      Gait: Gait is intact.      Deep Tendon Reflexes:      Reflex Scores:       Tricep reflexes are 2+ on the right side and 2+ on the left side.       Bicep  reflexes are 2+ on the right side and 2+ on the left side.       Brachioradialis reflexes are 2+ on the right side and 2+ on the left side.       Patellar reflexes are 2+ on the right side and 2+ on the left side.       Achilles reflexes are 2+ on the right side and 2+ on the left side.     Comments: 4/23/24       Plan   Today's treatment:  SMT to regions of segmental dysfunction identified on exam, using age-appropriate force, and manual diversified technique.   Manual dynamic stretching of the gluteal muscles, hamstrings, upper trapezius, BL shoulders  Dry needling (10 in/out to region of the chief complaint / hypertonic muscles identified upon palpation in R supraspinatus  3 to 3:15 PM  Patient noted reduced pain and improved mobility post-treatment     Treatment Plan:   The patient and I discussed the risks and benefits of chiropractic care. Based on the patient's subjective complaints along with the examination findings, it is advised that a course of chiropractic treatment by initiated. The patient provided consent for care. The patient tolerated today's treatment with little or no additional discomfort and was instructed to contact the office for questions or concerns. Will see patient once per week then every 2 weeks when symptoms become mild/manageable, further spaced apart contingent upon improvement.     This chart note was generated using dictation software, and as such, there may be typographical errors present. Abbreviations: Cervical spine (CS), cervical-thoracic (CT), Dry needling (DN), Flexion adduction internal rotation (FAIR), high velocity, low amplitude (HVLA), Lumbar spine (LS), Soft tissue manipulation (STM), spinal manipulative therapy (SMT), Straight leg raise (SLR), Thoracic spine (TS).

## 2025-06-10 ENCOUNTER — APPOINTMENT (OUTPATIENT)
Dept: INTEGRATIVE MEDICINE | Facility: CLINIC | Age: 45
End: 2025-06-10
Payer: COMMERCIAL

## 2025-06-10 DIAGNOSIS — M79.10 MYALGIA: ICD-10-CM

## 2025-06-10 DIAGNOSIS — M99.01 CERVICAL SEGMENT DYSFUNCTION: ICD-10-CM

## 2025-06-10 DIAGNOSIS — M54.50 CHRONIC BILATERAL LOW BACK PAIN WITHOUT SCIATICA: ICD-10-CM

## 2025-06-10 DIAGNOSIS — M25.511 RIGHT SHOULDER PAIN, UNSPECIFIED CHRONICITY: ICD-10-CM

## 2025-06-10 DIAGNOSIS — M99.03 SOMATIC DYSFUNCTION OF LUMBAR REGION: Primary | ICD-10-CM

## 2025-06-10 DIAGNOSIS — M99.05 SOMATIC DYSFUNCTION OF PELVIS REGION: ICD-10-CM

## 2025-06-10 DIAGNOSIS — G89.29 CHRONIC BILATERAL LOW BACK PAIN WITHOUT SCIATICA: ICD-10-CM

## 2025-06-10 DIAGNOSIS — M99.02 SOMATIC DYSFUNCTION OF THORACIC REGION: ICD-10-CM

## 2025-06-10 PROCEDURE — 98941 CHIROPRACT MANJ 3-4 REGIONS: CPT | Performed by: CHIROPRACTOR

## 2025-06-10 PROCEDURE — 97112 NEUROMUSCULAR REEDUCATION: CPT | Performed by: CHIROPRACTOR

## 2025-06-26 NOTE — PROGRESS NOTES
Assessment/Plan   Patient's neck and left periscapular pain is consistent with possible mild cervical radiculopathy considering he has slight diminished triceps reflex however his strength is preserved and there are no other neurologic deficits.  Treatment will involve soft tissue and spinal manipulation which he has responded well to in the past.  CS radiograph January 2024 showed mild degenerative changes at C5/6 which appears to correlate with symptoms. Recommended ongoing card and we can obtain C/S MRI if needed if symptoms don't continue to improve or he develops any weakness or more significant neurological deficits. L shoulder pain possible impingement / supraspinatus tendinitis.    Visits this year: 3    Subjective   Patient reports mild intermittent local low back pain tightness and mild occasional medical neck tightness.  Main complaint today is left shoulder pain.  Notes that he does a lot of lifting with the left arm lifting up with the arm straight out in front of him lifting things that weigh 30 pounds and this repetitive motion can exacerbate left superior shoulder pain.  Pain is localized to the top of the left shoulder without radiation down the arm and is worsened with raising the arm.    Left wrist pain 5/21/24 -patient endorses 1 month history of left wrist pain which was initially in the middle of the wrist but now is more in the lateral aspect of the wrist.  Has been aggravated by working using his hands.  He works in [employer] line and is repetitively using the left hand for several hours per day and up to 9 or 10 days in a row as he is working a lot of overtime.  Denies any specific trauma or injury. Pain typically mild. Has improved since our previous visit when he first noted it.    HPI - Neck pain 12/26/2023 -patient endorses 1 month insidious onset of neck pain left cervical thoracic pain and periscapular pain with occasional tingling in the left upper extremity and hand.  Symptoms came on  without specific injury although he endorses a potentially unrelated issue of the left distal biceps/medial elbow.  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  Notes that his symptoms are exacerbated when extending his neck looking up.  Previously has benefited from chiropractic spinal manipulation but has not had this treatment recently. Prior history of low back pain and herniated disc and sciatic pain but this is currently not bothering him    LBP 4/23/2024 - Endorses chronic occasional LBP. Previously had more severe symptoms years prior which resolved with chiropractic care.     Review of Systems   Constitutional:  Negative for fever.   Eyes:  Negative for visual disturbance.   Respiratory:  Negative for shortness of breath.    Cardiovascular:  Negative for chest pain.   Gastrointestinal:  Negative for diarrhea, nausea and vomiting.   Genitourinary:  Negative for difficulty urinating and dysuria.   Skin:  Negative for color change.   Neurological:  Negative for dizziness.   Psychiatric/Behavioral:  Negative for agitation.    All other systems reviewed and are negative.    Objective   Examination findings (e.g., palpation & ROM): Decreased C/S and L/S ROM with pain, hypertonic and tender cervical erectors, and R LS erectors and R QL  SLR BL 65  -ve FAIR BL  Mild tenderness L olecranon, lateral epicondyle, restricted pa glide L radial head  Tender L supraspinatus, mild dec L radha IR, -ve crank/clunk    Segmental joint dysfunction was identified in the following areas using motion palpation and/or pain provocation assessment:  Cervical: 7  Thoracic: 4-5  Lumbopelvic: 1, BL SIJ    Physical Exam  Neurological:      Mental Status: He is alert.      Sensory: Sensation is intact.      Motor: Motor function is intact.      Coordination: Coordination is intact.      Gait: Gait is intact.      Deep Tendon Reflexes:       Reflex Scores:       Tricep reflexes are 2+ on the right side and 2+ on the left side.       Bicep reflexes are 2+ on the right side and 2+ on the left side.       Brachioradialis reflexes are 2+ on the right side and 2+ on the left side.       Patellar reflexes are 2+ on the right side and 2+ on the left side.       Achilles reflexes are 2+ on the right side and 2+ on the left side.     Comments: 4/23/24       Plan   Today's treatment:  SMT to regions of segmental dysfunction identified on exam, using age-appropriate force, and manual diversified technique.   Manual dynamic stretching of the gluteal muscles, hamstrings, upper trapezius  Dry needling (10 in, 10 out) to region of the chief complaint / hypertonic muscles identified upon palpation in L supraspinatus  2:40 to 2:55 PM  Patient noted reduced pain and improved mobility post-treatment     Treatment Plan:   The patient and I discussed the risks and benefits of chiropractic care. Based on the patient's subjective complaints along with the examination findings, it is advised that a course of chiropractic treatment by initiated. The patient provided consent for care. The patient tolerated today's treatment with little or no additional discomfort and was instructed to contact the office for questions or concerns. Will see patient once per week then every 2 weeks when symptoms become mild/manageable, further spaced apart contingent upon improvement.     This chart note was generated using dictation software, and as such, there may be typographical errors present. Abbreviations: Cervical spine (CS), cervical-thoracic (CT), Dry needling (DN), Flexion adduction internal rotation (FAIR), high velocity, low amplitude (HVLA), Lumbar spine (LS), Soft tissue manipulation (STM), spinal manipulative therapy (SMT), Straight leg raise (SLR), Thoracic spine (TS).   Consent: The patient's consent was obtained including but not limited to risks of crusting, scabbing, blistering, scarring, darker or lighter pigmentary change, recurrence, incomplete removal and infection. Duration Of Freeze Thaw-Cycle (Seconds): 4 Show Applicator Variable?: Yes Post-Care Instructions: I reviewed with the patient in detail post-care instructions. Patient is to wear sunprotection, and avoid picking at any of the treated lesions. Pt may apply Vaseline to crusted or scabbing areas. Number Of Freeze-Thaw Cycles: 1 freeze-thaw cycle Render Note In Bullet Format When Appropriate: No Detail Level: Detailed

## 2025-06-30 ASSESSMENT — ENCOUNTER SYMPTOMS
DIARRHEA: 0
DIFFICULTY URINATING: 0
AGITATION: 0
VOMITING: 0
FEVER: 0
NAUSEA: 0
COLOR CHANGE: 0
SHORTNESS OF BREATH: 0
DIZZINESS: 0
DYSURIA: 0

## 2025-06-30 NOTE — PROGRESS NOTES
Assessment/Plan   Patient's LBP is possibly discogenic.  Treatment will involve soft tissue and spinal manipulation which he has responded well to in the past.  CS radiograph January 2024 showed mild degenerative changes at C5/6 and he had radicular symptoms at the time, but these have improved. Recommended ongoing card and we can obtain C/S MRI if needed if symptoms don't continue to improve or he develops any weakness or more significant neurological deficits. L shoulder pain possible impingement / supraspinatus tendinitis.    Visits this year: 6    Subjective   Patient reports recent ongoing left shoulder pain with popping and clicking sensation. Pain mild to moderate, intermittent. Shoulder pain has been accompanied by increase in numbness in the hand - first three digits which is occasional occurring mostly with sitting or driving. Lower back pain rated 6/10 last week and is doing better today and mild.     Left wrist pain 5/21/24 -patient endorses 1 month history of left wrist pain which was initially in the middle of the wrist but now is more in the lateral aspect of the wrist.  Has been aggravated by working using his hands.  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  Denies any specific trauma or injury. Pain typically mild. Has improved since our previous visit when he first noted it.    HPI - Neck pain 12/26/2023 -patient endorses 1 month insidious onset of neck pain left cervical thoracic pain and periscapular pain with occasional tingling in the left upper extremity and hand.  Symptoms came on without specific injury although he endorses a potentially unrelated issue of the left distal biceps/medial elbow.  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  Notes that his symptoms are exacerbated  when extending his neck looking up.  Previously has benefited from chiropractic spinal manipulation but has not had this treatment recently. Prior history of low back pain and herniated disc and sciatic pain but this is currently not bothering him    LBP 4/23/2024 - Endorses chronic occasional LBP. Previously had more severe symptoms years prior which resolved with chiropractic care.     Review of Systems   Constitutional:  Negative for fever.   Eyes:  Negative for visual disturbance.   Respiratory:  Negative for shortness of breath.    Cardiovascular:  Negative for chest pain.   Gastrointestinal:  Negative for diarrhea, nausea and vomiting.   Genitourinary:  Negative for difficulty urinating and dysuria.   Skin:  Negative for color change.   Neurological:  Negative for dizziness.   Psychiatric/Behavioral:  Negative for agitation.    All other systems reviewed and are negative.    Objective   Examination findings (e.g., palpation & ROM): Decreased C/S and L/S ROM with pain, hypertonic and tender cervical erectors, and R LS erectors and R QL  SLR BL 65  -ve FAIR BL  Mild tenderness L olecranon, lateral epicondyle, restricted pa glide L radial head  Tender R supraspinatus, mild dec L radha IR, -ve crank/clunk    Segmental joint dysfunction was identified in the following areas using motion palpation and/or pain provocation assessment:  Cervical: 7  Thoracic: 4-7  Lumbopelvic: 1, BL SIJ    Physical Exam  Neurological:      Mental Status: He is alert.      Sensory: Sensation is intact.      Motor: Motor function is intact.      Coordination: Coordination is intact.      Gait: Gait is intact.      Deep Tendon Reflexes:      Reflex Scores:       Tricep reflexes are 2+ on the right side and 2+ on the left side.       Bicep reflexes are 2+ on the right side and 2+ on the left side.       Brachioradialis reflexes are 2+ on the right side and 2+ on the left side.       Patellar reflexes are 2+ on the right side and 2+ on the  left side.       Achilles reflexes are 2+ on the right side and 2+ on the left side.     Comments: 4/23/24       Plan   Today's treatment:  SMT to regions of segmental dysfunction identified on exam, using age-appropriate force, and manual diversified technique.   Manual dynamic stretching of the gluteal muscles, hamstrings, upper trapezius, BL shoulders  Dry needling (10 in/out to region of the chief complaint / hypertonic muscles identified upon palpation in L supraspinatus  2:40 to 2:57 PM  Patient noted reduced pain and improved mobility post-treatment     Treatment Plan:   The patient and I discussed the risks and benefits of chiropractic care. Based on the patient's subjective complaints along with the examination findings, it is advised that a course of chiropractic treatment by initiated. The patient provided consent for care. The patient tolerated today's treatment with little or no additional discomfort and was instructed to contact the office for questions or concerns. Will see patient once per week then every 2 weeks when symptoms become mild/manageable, further spaced apart contingent upon improvement.     This chart note was generated using dictation software, and as such, there may be typographical errors present. Abbreviations: Cervical spine (CS), cervical-thoracic (CT), Dry needling (DN), Flexion adduction internal rotation (FAIR), high velocity, low amplitude (HVLA), Lumbar spine (LS), Soft tissue manipulation (STM), spinal manipulative therapy (SMT), Straight leg raise (SLR), Thoracic spine (TS).

## 2025-07-01 ENCOUNTER — APPOINTMENT (OUTPATIENT)
Dept: INTEGRATIVE MEDICINE | Facility: CLINIC | Age: 45
End: 2025-07-01
Payer: COMMERCIAL

## 2025-07-01 DIAGNOSIS — M99.03 SOMATIC DYSFUNCTION OF LUMBAR REGION: Primary | ICD-10-CM

## 2025-07-01 DIAGNOSIS — G89.29 CHRONIC BILATERAL LOW BACK PAIN WITHOUT SCIATICA: ICD-10-CM

## 2025-07-01 DIAGNOSIS — M99.02 SOMATIC DYSFUNCTION OF THORACIC REGION: ICD-10-CM

## 2025-07-01 DIAGNOSIS — M99.05 SOMATIC DYSFUNCTION OF PELVIS REGION: ICD-10-CM

## 2025-07-01 DIAGNOSIS — M54.50 CHRONIC BILATERAL LOW BACK PAIN WITHOUT SCIATICA: ICD-10-CM

## 2025-07-01 DIAGNOSIS — M79.10 MYALGIA: ICD-10-CM

## 2025-07-01 DIAGNOSIS — M99.01 CERVICAL SEGMENT DYSFUNCTION: ICD-10-CM

## 2025-07-01 PROCEDURE — 98941 CHIROPRACT MANJ 3-4 REGIONS: CPT | Performed by: CHIROPRACTOR

## 2025-07-01 PROCEDURE — 97112 NEUROMUSCULAR REEDUCATION: CPT | Performed by: CHIROPRACTOR

## 2025-07-09 ASSESSMENT — ENCOUNTER SYMPTOMS
COLOR CHANGE: 0
DIARRHEA: 0
VOMITING: 0
NAUSEA: 0
DIZZINESS: 0
DYSURIA: 0
FEVER: 0
SHORTNESS OF BREATH: 0
DIFFICULTY URINATING: 0
AGITATION: 0

## 2025-07-09 NOTE — PROGRESS NOTES
Assessment/Plan   Patient's LBP is possibly discogenic.  Treatment will involve soft tissue and spinal manipulation which he has responded well to in the past.  CS radiograph January 2024 showed mild degenerative changes at C5/6 and he had radicular symptoms at the time, but these have improved. Recommended ongoing card and we can obtain C/S MRI if needed if symptoms don't continue to improve or he develops any weakness or more significant neurological deficits. L shoulder pain possible impingement / supraspinatus tendinitis.    Visits this year: 7    Subjective   Patient reports lingering right neck tension after muscle spasm approx 1 month ago. His work recently acquired a portable hoist which helps avoid left shoulder pain. Continues to experience paresthesia and tingling in the left arm and hand daily, but only occasionally during day, transient episodes with exertion.    Left wrist pain 5/21/24 -patient endorses 1 month history of left wrist pain which was initially in the middle of the wrist but now is more in the lateral aspect of the wrist.  Has been aggravated by working using his hands.  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  Denies any specific trauma or injury. Pain typically mild. Has improved since our previous visit when he first noted it.    HPI - Neck pain 12/26/2023 -patient endorses 1 month insidious onset of neck pain left cervical thoracic pain and periscapular pain with occasional tingling in the left upper extremity and hand.  Symptoms came on without specific injury although he endorses a potentially unrelated issue of the left distal biceps/medial elbow.  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  Notes that his symptoms are exacerbated when extending his neck looking up.   Previously has benefited from chiropractic spinal manipulation but has not had this treatment recently. Prior history of low back pain and herniated disc and sciatic pain but this is currently not bothering him    LBP 4/23/2024 - Endorses chronic occasional LBP. Previously had more severe symptoms years prior which resolved with chiropractic care.     Review of Systems   Constitutional:  Negative for fever.   Eyes:  Negative for visual disturbance.   Respiratory:  Negative for shortness of breath.    Cardiovascular:  Negative for chest pain.   Gastrointestinal:  Negative for diarrhea, nausea and vomiting.   Genitourinary:  Negative for difficulty urinating and dysuria.   Skin:  Negative for color change.   Neurological:  Negative for dizziness.   Psychiatric/Behavioral:  Negative for agitation.    All other systems reviewed and are negative.    Objective   Examination findings (e.g., palpation & ROM): Decreased C/S and L/S ROM with pain, hypertonic and tender cervical erectors, and R LS erectors and R QL  SLR BL 65  -ve FAIR BL  Mild tenderness L olecranon, lateral epicondyle, restricted pa glide L radial head  Tender R supraspinatus, mild dec L radha IR, -ve crank/clunk    Segmental joint dysfunction was identified in the following areas using motion palpation and/or pain provocation assessment:  Cervical: 7  Thoracic: 4-8  Lumbopelvic: 1, BL SIJ    Physical Exam  Neurological:      Mental Status: He is alert.      Sensory: Sensation is intact.      Motor: Motor function is intact.      Coordination: Coordination is intact.      Gait: Gait is intact.      Deep Tendon Reflexes:      Reflex Scores:       Tricep reflexes are 2+ on the right side and 2+ on the left side.       Bicep reflexes are 2+ on the right side and 2+ on the left side.       Brachioradialis reflexes are 2+ on the right side and 2+ on the left side.       Patellar reflexes are 2+ on the right side and 2+ on the left side.       Achilles reflexes are  2+ on the right side and 2+ on the left side.     Comments: 4/23/24     Plan   Today's treatment:  SMT to regions of segmental dysfunction identified on exam, using age-appropriate force, and manual diversified technique.   Manual dynamic stretching of the gluteal muscles, hamstrings, upper trapezius, BL shoulders  Dry needling (10 in/out to region of the chief complaint / hypertonic muscles identified upon palpation in L upper trapezius & CS erectors  3:30 to 3:45 PM  Patient noted reduced pain and improved mobility post-treatment     Treatment Plan:   The patient and I discussed the risks and benefits of chiropractic care. Based on the patient's subjective complaints along with the examination findings, it is advised that a course of chiropractic treatment by initiated. The patient provided consent for care. The patient tolerated today's treatment with little or no additional discomfort and was instructed to contact the office for questions or concerns. Will see patient once per week then every 2 weeks when symptoms become mild/manageable, further spaced apart contingent upon improvement.     This chart note was generated using dictation software, and as such, there may be typographical errors present. Abbreviations: Cervical spine (CS), cervical-thoracic (CT), Dry needling (DN), Flexion adduction internal rotation (FAIR), high velocity, low amplitude (HVLA), Lumbar spine (LS), Soft tissue manipulation (STM), spinal manipulative therapy (SMT), Straight leg raise (SLR), Thoracic spine (TS).

## 2025-07-10 ENCOUNTER — APPOINTMENT (OUTPATIENT)
Dept: INTEGRATIVE MEDICINE | Facility: CLINIC | Age: 45
End: 2025-07-10
Payer: COMMERCIAL

## 2025-07-10 DIAGNOSIS — M99.02 SOMATIC DYSFUNCTION OF THORACIC REGION: ICD-10-CM

## 2025-07-10 DIAGNOSIS — M79.10 MYALGIA: ICD-10-CM

## 2025-07-10 DIAGNOSIS — M99.03 SOMATIC DYSFUNCTION OF LUMBAR REGION: Primary | ICD-10-CM

## 2025-07-10 DIAGNOSIS — M99.05 SOMATIC DYSFUNCTION OF PELVIS REGION: ICD-10-CM

## 2025-07-10 DIAGNOSIS — M54.50 CHRONIC BILATERAL LOW BACK PAIN WITHOUT SCIATICA: ICD-10-CM

## 2025-07-10 DIAGNOSIS — M99.01 CERVICAL SEGMENT DYSFUNCTION: ICD-10-CM

## 2025-07-10 DIAGNOSIS — G89.29 CHRONIC BILATERAL LOW BACK PAIN WITHOUT SCIATICA: ICD-10-CM

## 2025-07-10 DIAGNOSIS — M54.12 CERVICAL RADICULOPATHY: ICD-10-CM

## 2025-07-10 PROCEDURE — 97112 NEUROMUSCULAR REEDUCATION: CPT | Performed by: CHIROPRACTOR

## 2025-07-10 PROCEDURE — 98941 CHIROPRACT MANJ 3-4 REGIONS: CPT | Performed by: CHIROPRACTOR

## 2025-07-16 ASSESSMENT — ENCOUNTER SYMPTOMS
SHORTNESS OF BREATH: 0
DIFFICULTY URINATING: 0
DIARRHEA: 0
FEVER: 0
VOMITING: 0
COLOR CHANGE: 0
DYSURIA: 0
DIZZINESS: 0
AGITATION: 0
NAUSEA: 0

## 2025-07-16 NOTE — PROGRESS NOTES
Assessment/Plan   Patient's LBP is possibly discogenic.  Treatment will involve soft tissue and spinal manipulation which he has responded well to in the past.  CS radiograph January 2024 showed mild degenerative changes at C5/6 and he had radicular symptoms at the time, but these have improved. Recommended ongoing card and we can obtain C/S MRI if needed if symptoms don't continue to improve or he develops any weakness or more significant neurological deficits. L shoulder pain possible impingement / supraspinatus tendinitis.    Visits this year: 8    Subjective   Patient reports improvement overall with respect to his neck pain and left shoulder pain.  Neck pain is mild and intermittent worse on the left side and also has some left freq mild suprascapular pain and tightness.  Notes that he is not doing as much overhead or over shoulder lifting and tries to warm up and stretch before work. No current LBP    Left wrist pain 5/21/24 -patient endorses 1 month history of left wrist pain which was initially in the middle of the wrist but now is more in the lateral aspect of the wrist.  Has been aggravated by working using his hands.  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  Denies any specific trauma or injury. Pain typically mild. Has improved since our previous visit when he first noted it.    HPI - Neck pain 12/26/2023 -patient endorses 1 month insidious onset of neck pain left cervical thoracic pain and periscapular pain with occasional tingling in the left upper extremity and hand.  Symptoms came on without specific injury although he endorses a potentially unrelated issue of the left distal biceps/medial elbow.  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  Notes that his symptoms are exacerbated when  extending his neck looking up.  Previously has benefited from chiropractic spinal manipulation but has not had this treatment recently. Prior history of low back pain and herniated disc and sciatic pain but this is currently not bothering him    LBP 4/23/2024 - Endorses chronic occasional LBP. Previously had more severe symptoms years prior which resolved with chiropractic care.     Review of Systems   Constitutional:  Negative for fever.   Eyes:  Negative for visual disturbance.   Respiratory:  Negative for shortness of breath.    Cardiovascular:  Negative for chest pain.   Gastrointestinal:  Negative for diarrhea, nausea and vomiting.   Genitourinary:  Negative for difficulty urinating and dysuria.   Skin:  Negative for color change.   Neurological:  Negative for dizziness.   Psychiatric/Behavioral:  Negative for agitation.    All other systems reviewed and are negative.    Objective   Examination findings (e.g., palpation & ROM): Decreased C/S and L/S ROM with pain, hypertonic and tender cervical erectors, and R LS erectors and R QL  SLR BL 65  -ve FAIR BL  Mild tenderness L olecranon, lateral epicondyle, restricted pa glide L radial head  Tender R supraspinatus, mild dec L radha IR, -ve crank/clunk    Segmental joint dysfunction was identified in the following areas using motion palpation and/or pain provocation assessment:  Cervical: 7  Thoracic: 4-8  Lumbopelvic: 1, BL SIJ    Physical Exam    Neurological:      Mental Status: He is alert.      Sensory: Sensation is intact.      Motor: Motor function is intact.      Coordination: Coordination is intact.      Gait: Gait is intact.      Deep Tendon Reflexes:      Reflex Scores:       Tricep reflexes are 2+ on the right side and 2+ on the left side.       Bicep reflexes are 2+ on the right side and 2+ on the left side.       Brachioradialis reflexes are 2+ on the right side and 2+ on the left side.     Comments: 7/17/25     Plan   Today's treatment:  SMT to regions  of segmental dysfunction identified on exam, using age-appropriate force, and manual diversified technique.   Manual dynamic stretching of the gluteal muscles, hamstrings, upper trapezius, BL shoulders  Dry needling (10 in/out to region of the chief complaint / hypertonic muscles identified upon palpation in L upper trapezius & CS erectors  2:40 to 2:55 PM  Patient noted reduced pain and improved mobility post-treatment     Treatment Plan:   The patient and I discussed the risks and benefits of chiropractic care. Based on the patient's subjective complaints along with the examination findings, it is advised that a course of chiropractic treatment by initiated. The patient provided consent for care. The patient tolerated today's treatment with little or no additional discomfort and was instructed to contact the office for questions or concerns. Will see patient once per week then every 2 weeks when symptoms become mild/manageable, further spaced apart contingent upon improvement.     This chart note was generated using dictation software, and as such, there may be typographical errors present. Abbreviations: Cervical spine (CS), cervical-thoracic (CT), Dry needling (DN), Flexion adduction internal rotation (FAIR), high velocity, low amplitude (HVLA), Lumbar spine (LS), Soft tissue manipulation (STM), spinal manipulative therapy (SMT), Straight leg raise (SLR), Thoracic spine (TS).

## 2025-07-17 ENCOUNTER — APPOINTMENT (OUTPATIENT)
Dept: INTEGRATIVE MEDICINE | Facility: CLINIC | Age: 45
End: 2025-07-17
Payer: COMMERCIAL

## 2025-07-17 DIAGNOSIS — M99.01 CERVICAL SEGMENT DYSFUNCTION: ICD-10-CM

## 2025-07-17 DIAGNOSIS — M54.12 CERVICAL RADICULOPATHY: ICD-10-CM

## 2025-07-17 DIAGNOSIS — G89.29 CHRONIC BILATERAL LOW BACK PAIN WITHOUT SCIATICA: ICD-10-CM

## 2025-07-17 DIAGNOSIS — M54.50 CHRONIC BILATERAL LOW BACK PAIN WITHOUT SCIATICA: ICD-10-CM

## 2025-07-17 DIAGNOSIS — M79.10 MYALGIA: ICD-10-CM

## 2025-07-17 DIAGNOSIS — M99.03 SOMATIC DYSFUNCTION OF LUMBAR REGION: Primary | ICD-10-CM

## 2025-07-17 DIAGNOSIS — M99.02 SOMATIC DYSFUNCTION OF THORACIC REGION: ICD-10-CM

## 2025-07-17 DIAGNOSIS — M99.05 SOMATIC DYSFUNCTION OF PELVIS REGION: ICD-10-CM

## 2025-07-17 PROCEDURE — 97112 NEUROMUSCULAR REEDUCATION: CPT | Performed by: CHIROPRACTOR

## 2025-07-17 PROCEDURE — 98941 CHIROPRACT MANJ 3-4 REGIONS: CPT | Performed by: CHIROPRACTOR

## 2025-07-23 ASSESSMENT — ENCOUNTER SYMPTOMS
AGITATION: 0
DYSURIA: 0
DIARRHEA: 0
NAUSEA: 0
SHORTNESS OF BREATH: 0
VOMITING: 0
COLOR CHANGE: 0
FEVER: 0
DIFFICULTY URINATING: 0
DIZZINESS: 0

## 2025-07-23 NOTE — PROGRESS NOTES
Assessment/Plan   Patient's LBP is possibly discogenic.  Treatment will involve soft tissue and spinal manipulation which he has responded well to in the past.  CS radiograph January 2024 showed mild degenerative changes at C5/6 and he had radicular symptoms at the time, but these have improved. Recommended ongoing card and we can obtain C/S MRI if needed if symptoms don't continue to improve or he develops any weakness or more significant neurological deficits. L shoulder pain possible impingement / supraspinatus tendinitis.    Visits this year: 9    Subjective   Patient reports neck pain is mild and intermittent, worse on the left side and also has some left freq mild suprascapular pain and tightness. Wants to dry needle left supraspinatus and upper trap region to prepare for upcoming road trip to [place]. Lower and mid back are doing okay.     Left wrist pain 5/21/24 -patient endorses 1 month history of left wrist pain which was initially in the middle of the wrist but now is more in the lateral aspect of the wrist.  Has been aggravated by working using his hands.  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  Denies any specific trauma or injury. Pain typically mild. Has improved since our previous visit when he first noted it.    HPI - Neck pain 12/26/2023 -patient endorses 1 month insidious onset of neck pain left cervical thoracic pain and periscapular pain with occasional tingling in the left upper extremity and hand.  Symptoms came on without specific injury although he endorses a potentially unrelated issue of the left distal biceps/medial elbow.  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  Notes that his symptoms are exacerbated when extending his neck looking up.  Previously has benefited  from chiropractic spinal manipulation but has not had this treatment recently. Prior history of low back pain and herniated disc and sciatic pain but this is currently not bothering him    LBP 4/23/2024 - Endorses chronic occasional LBP. Previously had more severe symptoms years prior which resolved with chiropractic care.     Review of Systems   Constitutional:  Negative for fever.   Eyes:  Negative for visual disturbance.   Respiratory:  Negative for shortness of breath.    Cardiovascular:  Negative for chest pain.   Gastrointestinal:  Negative for diarrhea, nausea and vomiting.   Genitourinary:  Negative for difficulty urinating and dysuria.   Skin:  Negative for color change.   Neurological:  Negative for dizziness.   Psychiatric/Behavioral:  Negative for agitation.    All other systems reviewed and are negative.    Objective   Examination findings (e.g., palpation & ROM): Decreased C/S and L/S ROM with pain, hypertonic and tender cervical erectors, and R LS erectors and R QL  SLR BL 65  -ve FAIR BL  Mild tenderness L olecranon, lateral epicondyle, restricted pa glide L radial head  Tender R supraspinatus, mild dec L radha IR, -ve crank/clunk    Segmental joint dysfunction was identified in the following areas using motion palpation and/or pain provocation assessment:  Cervical: 7  Thoracic: 4-9  Lumbopelvic: 1, BL SIJ    Physical Exam    Neurological:      Mental Status: He is alert.      Sensory: Sensation is intact.      Motor: Motor function is intact.      Coordination: Coordination is intact.      Gait: Gait is intact.      Deep Tendon Reflexes:      Reflex Scores:       Tricep reflexes are 2+ on the right side and 2+ on the left side.       Bicep reflexes are 2+ on the right side and 2+ on the left side.       Brachioradialis reflexes are 2+ on the right side and 2+ on the left side.     Comments: 7/17/25     Plan   Today's treatment:  SMT to regions of segmental dysfunction identified on exam, using  age-appropriate force, and manual diversified technique.   Manual dynamic stretching of the gluteal muscles, hamstrings, upper trapezius, BL shoulders  Dry needling (10 in/out to region of the chief complaint / hypertonic muscles identified upon palpation in L upper trapezius & CS erectors  2:40 to 2:55 PM  Patient noted reduced pain and improved mobility post-treatment     Treatment Plan:   The patient and I discussed the risks and benefits of chiropractic care. Based on the patient's subjective complaints along with the examination findings, it is advised that a course of chiropractic treatment by initiated. The patient provided consent for care. The patient tolerated today's treatment with little or no additional discomfort and was instructed to contact the office for questions or concerns. Will see patient once per week then every 2 weeks when symptoms become mild/manageable, further spaced apart contingent upon improvement.     This chart note was generated using dictation software, and as such, there may be typographical errors present. Abbreviations: Cervical spine (CS), cervical-thoracic (CT), Dry needling (DN), Flexion adduction internal rotation (FAIR), high velocity, low amplitude (HVLA), Lumbar spine (LS), Soft tissue manipulation (STM), spinal manipulative therapy (SMT), Straight leg raise (SLR), Thoracic spine (TS).

## 2025-07-24 ENCOUNTER — APPOINTMENT (OUTPATIENT)
Dept: INTEGRATIVE MEDICINE | Facility: CLINIC | Age: 45
End: 2025-07-24
Payer: COMMERCIAL

## 2025-07-24 DIAGNOSIS — M99.01 CERVICAL SEGMENT DYSFUNCTION: ICD-10-CM

## 2025-07-24 DIAGNOSIS — G89.29 CHRONIC BILATERAL LOW BACK PAIN WITHOUT SCIATICA: ICD-10-CM

## 2025-07-24 DIAGNOSIS — M99.05 SOMATIC DYSFUNCTION OF PELVIS REGION: ICD-10-CM

## 2025-07-24 DIAGNOSIS — M99.02 SOMATIC DYSFUNCTION OF THORACIC REGION: ICD-10-CM

## 2025-07-24 DIAGNOSIS — M79.10 MYALGIA: ICD-10-CM

## 2025-07-24 DIAGNOSIS — M99.03 SOMATIC DYSFUNCTION OF LUMBAR REGION: Primary | ICD-10-CM

## 2025-07-24 DIAGNOSIS — M54.12 CERVICAL RADICULOPATHY: ICD-10-CM

## 2025-07-24 DIAGNOSIS — M54.50 CHRONIC BILATERAL LOW BACK PAIN WITHOUT SCIATICA: ICD-10-CM

## 2025-07-24 PROCEDURE — 97112 NEUROMUSCULAR REEDUCATION: CPT | Performed by: CHIROPRACTOR

## 2025-07-24 PROCEDURE — 98941 CHIROPRACT MANJ 3-4 REGIONS: CPT | Performed by: CHIROPRACTOR

## 2025-08-06 ASSESSMENT — ENCOUNTER SYMPTOMS
FEVER: 0
DIZZINESS: 0
DIARRHEA: 0
SHORTNESS OF BREATH: 0
DYSURIA: 0
COLOR CHANGE: 0
NAUSEA: 0
DIFFICULTY URINATING: 0
AGITATION: 0
VOMITING: 0

## 2025-08-06 NOTE — PROGRESS NOTES
Assessment/Plan   Patient's LBP is possibly discogenic.  Treatment will involve soft tissue and spinal manipulation which he has responded well to in the past.  CS radiograph January 2024 showed mild degenerative changes at C5/6 and he had radicular symptoms at the time, but these have improved. Recommended ongoing card and we can obtain C/S MRI if needed if symptoms don't continue to improve or he develops any weakness or more significant neurological deficits. L shoulder pain possible impingement / supraspinatus tendinitis.    Visits this year: 10    Subjective   Patient reports substantial improvement overall with respect to the left shoulder noting that he only has mild occasional pain and tightness in the left suprascapular region.  Neck pain is mild and occasional low back pain is currently absent although he occasionally will have episodes of mild tightness there.  Recently had long road trip which did not flare his symptoms.  He used to massaging chair while he was driving which helped his back feel better during a long trip    Left wrist pain 5/21/24 -patient endorses 1 month history of left wrist pain which was initially in the middle of the wrist but now is more in the lateral aspect of the wrist.  Has been aggravated by working using his hands.  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  Denies any specific trauma or injury. Pain typically mild. Has improved since our previous visit when he first noted it.    HPI - Neck pain 12/26/2023 -patient endorses 1 month insidious onset of neck pain left cervical thoracic pain and periscapular pain with occasional tingling in the left upper extremity and hand.  Symptoms came on without specific injury although he endorses a potentially unrelated issue of the left distal biceps/medial elbow.  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  Notes that his symptoms are exacerbated when extending his neck looking up.  Previously has benefited from chiropractic spinal manipulation but has not had this treatment recently. Prior history of low back pain and herniated disc and sciatic pain but this is currently not bothering him    LBP 4/23/2024 - Endorses chronic occasional LBP. Previously had more severe symptoms years prior which resolved with chiropractic care.     Review of Systems   Constitutional:  Negative for fever.   Eyes:  Negative for visual disturbance.   Respiratory:  Negative for shortness of breath.    Cardiovascular:  Negative for chest pain.   Gastrointestinal:  Negative for diarrhea, nausea and vomiting.   Genitourinary:  Negative for difficulty urinating and dysuria.   Skin:  Negative for color change.   Neurological:  Negative for dizziness.   Psychiatric/Behavioral:  Negative for agitation.    All other systems reviewed and are negative.    Objective   Examination findings (e.g., palpation & ROM): Decreased C/S and L/S ROM with pain, hypertonic and tender cervical erectors, and R LS erectors, L supraspinatus  SLR BL 65  -ve FAIR BL  Mild tenderness L olecranon, lateral epicondyle, restricted pa glide L radial head      Segmental joint dysfunction was identified in the following areas using motion palpation and/or pain provocation assessment:  Cervical: 7  Thoracic: 4-9  Lumbopelvic: 1, BL SIJ    Physical Exam    Neurological:      Mental Status: He is alert.      Sensory: Sensation is intact.      Motor: Motor function is intact.      Coordination: Coordination is intact.      Gait: Gait is intact.      Deep Tendon Reflexes:      Reflex Scores:       Tricep reflexes are 2+ on the right side and 2+ on the left side.       Bicep reflexes are 2+ on the right side and 2+ on the left side.       Brachioradialis reflexes are 2+ on the right side and 2+ on  the left side.     Comments: 7/17/25     Plan   Today's treatment:  SMT to regions of segmental dysfunction identified on exam, using age-appropriate force, and manual diversified technique.   Manual dynamic stretching of the gluteal muscles, hamstrings, upper trapezius, BL shoulders  Dry needling (10 in/out to region of the chief complaint / hypertonic muscles identified upon palpation in L upper trapezius and supraspinatus  2:40 to 2:56 PM  Patient noted reduced pain and improved mobility post-treatment     Treatment Plan:   The patient and I discussed the risks and benefits of chiropractic care. Based on the patient's subjective complaints along with the examination findings, it is advised that a course of chiropractic treatment by initiated. The patient provided consent for care. The patient tolerated today's treatment with little or no additional discomfort and was instructed to contact the office for questions or concerns. Will see patient once per week then every 2 weeks when symptoms become mild/manageable, further spaced apart contingent upon improvement.     This chart note was generated using dictation software, and as such, there may be typographical errors present. Abbreviations: Cervical spine (CS), cervical-thoracic (CT), Dry needling (DN), Flexion adduction internal rotation (FAIR), high velocity, low amplitude (HVLA), Lumbar spine (LS), Soft tissue manipulation (STM), spinal manipulative therapy (SMT), Straight leg raise (SLR), Thoracic spine (TS).

## 2025-08-07 ENCOUNTER — APPOINTMENT (OUTPATIENT)
Dept: INTEGRATIVE MEDICINE | Facility: CLINIC | Age: 45
End: 2025-08-07
Payer: COMMERCIAL

## 2025-08-07 DIAGNOSIS — G89.29 CHRONIC BILATERAL LOW BACK PAIN WITHOUT SCIATICA: ICD-10-CM

## 2025-08-07 DIAGNOSIS — M99.03 SOMATIC DYSFUNCTION OF LUMBAR REGION: Primary | ICD-10-CM

## 2025-08-07 DIAGNOSIS — M99.05 SOMATIC DYSFUNCTION OF PELVIS REGION: ICD-10-CM

## 2025-08-07 DIAGNOSIS — M79.10 MYALGIA: ICD-10-CM

## 2025-08-07 DIAGNOSIS — M99.01 CERVICAL SEGMENT DYSFUNCTION: ICD-10-CM

## 2025-08-07 DIAGNOSIS — M54.50 CHRONIC BILATERAL LOW BACK PAIN WITHOUT SCIATICA: ICD-10-CM

## 2025-08-07 DIAGNOSIS — M99.02 SOMATIC DYSFUNCTION OF THORACIC REGION: ICD-10-CM

## 2025-08-07 DIAGNOSIS — M54.12 CERVICAL RADICULOPATHY: ICD-10-CM

## 2025-08-07 PROCEDURE — 97112 NEUROMUSCULAR REEDUCATION: CPT | Performed by: CHIROPRACTOR

## 2025-08-07 PROCEDURE — 98941 CHIROPRACT MANJ 3-4 REGIONS: CPT | Performed by: CHIROPRACTOR

## 2025-08-18 ASSESSMENT — ENCOUNTER SYMPTOMS
AGITATION: 0
DIZZINESS: 0
NAUSEA: 0
SHORTNESS OF BREATH: 0
DIFFICULTY URINATING: 0
DYSURIA: 0
VOMITING: 0
COLOR CHANGE: 0
FEVER: 0
DIARRHEA: 0

## 2025-08-19 ENCOUNTER — APPOINTMENT (OUTPATIENT)
Dept: INTEGRATIVE MEDICINE | Facility: CLINIC | Age: 45
End: 2025-08-19
Payer: COMMERCIAL

## 2025-08-19 DIAGNOSIS — M99.05 SOMATIC DYSFUNCTION OF PELVIS REGION: ICD-10-CM

## 2025-08-19 DIAGNOSIS — M99.02 SOMATIC DYSFUNCTION OF THORACIC REGION: ICD-10-CM

## 2025-08-19 DIAGNOSIS — G89.29 CHRONIC BILATERAL LOW BACK PAIN WITHOUT SCIATICA: ICD-10-CM

## 2025-08-19 DIAGNOSIS — M79.10 MYALGIA: ICD-10-CM

## 2025-08-19 DIAGNOSIS — M99.01 CERVICAL SEGMENT DYSFUNCTION: ICD-10-CM

## 2025-08-19 DIAGNOSIS — M99.03 SOMATIC DYSFUNCTION OF LUMBAR REGION: Primary | ICD-10-CM

## 2025-08-19 DIAGNOSIS — M54.50 CHRONIC BILATERAL LOW BACK PAIN WITHOUT SCIATICA: ICD-10-CM

## 2025-08-19 DIAGNOSIS — M54.12 CERVICAL RADICULOPATHY: ICD-10-CM

## 2025-08-19 PROCEDURE — 98941 CHIROPRACT MANJ 3-4 REGIONS: CPT | Performed by: CHIROPRACTOR

## 2025-08-19 PROCEDURE — 97112 NEUROMUSCULAR REEDUCATION: CPT | Performed by: CHIROPRACTOR

## 2025-09-03 ENCOUNTER — APPOINTMENT (OUTPATIENT)
Dept: PRIMARY CARE | Facility: CLINIC | Age: 45
End: 2025-09-03
Payer: COMMERCIAL